# Patient Record
Sex: FEMALE | Race: WHITE | NOT HISPANIC OR LATINO | Employment: OTHER | URBAN - METROPOLITAN AREA
[De-identification: names, ages, dates, MRNs, and addresses within clinical notes are randomized per-mention and may not be internally consistent; named-entity substitution may affect disease eponyms.]

---

## 2017-03-22 ENCOUNTER — OFFICE VISIT (OUTPATIENT)
Dept: MEDICAL GROUP | Facility: LAB | Age: 67
End: 2017-03-22
Payer: MEDICARE

## 2017-03-22 VITALS
HEIGHT: 61 IN | HEART RATE: 49 BPM | TEMPERATURE: 97.9 F | SYSTOLIC BLOOD PRESSURE: 130 MMHG | RESPIRATION RATE: 16 BRPM | BODY MASS INDEX: 22.23 KG/M2 | WEIGHT: 117.73 LBS | DIASTOLIC BLOOD PRESSURE: 70 MMHG | OXYGEN SATURATION: 97 %

## 2017-03-22 DIAGNOSIS — Z13.6 ENCOUNTER FOR LIPID SCREENING FOR CARDIOVASCULAR DISEASE: ICD-10-CM

## 2017-03-22 DIAGNOSIS — M19.90 ARTHRITIS: ICD-10-CM

## 2017-03-22 DIAGNOSIS — Z13.220 ENCOUNTER FOR LIPID SCREENING FOR CARDIOVASCULAR DISEASE: ICD-10-CM

## 2017-03-22 DIAGNOSIS — Z00.00 HEALTH MAINTENANCE EXAMINATION: ICD-10-CM

## 2017-03-22 DIAGNOSIS — Z96.652 STATUS POST TOTAL LEFT KNEE REPLACEMENT: ICD-10-CM

## 2017-03-22 DIAGNOSIS — E78.00 ELEVATED LDL CHOLESTEROL LEVEL: ICD-10-CM

## 2017-03-22 DIAGNOSIS — Z12.11 SCREENING FOR MALIGNANT NEOPLASM OF COLON: ICD-10-CM

## 2017-03-22 DIAGNOSIS — Z13.21 ENCOUNTER FOR VITAMIN DEFICIENCY SCREENING: ICD-10-CM

## 2017-03-22 PROBLEM — Z96.659 S/P KNEE REPLACEMENT: Status: ACTIVE | Noted: 2017-03-22

## 2017-03-22 PROCEDURE — G8484 FLU IMMUNIZE NO ADMIN: HCPCS | Performed by: FAMILY MEDICINE

## 2017-03-22 PROCEDURE — G8432 DEP SCR NOT DOC, RNG: HCPCS | Performed by: FAMILY MEDICINE

## 2017-03-22 PROCEDURE — 1101F PT FALLS ASSESS-DOCD LE1/YR: CPT | Performed by: FAMILY MEDICINE

## 2017-03-22 PROCEDURE — 99204 OFFICE O/P NEW MOD 45 MIN: CPT | Performed by: FAMILY MEDICINE

## 2017-03-22 PROCEDURE — 4040F PNEUMOC VAC/ADMIN/RCVD: CPT | Mod: 8P | Performed by: FAMILY MEDICINE

## 2017-03-22 PROCEDURE — 3014F SCREEN MAMMO DOC REV: CPT | Mod: 8P | Performed by: FAMILY MEDICINE

## 2017-03-22 PROCEDURE — 1036F TOBACCO NON-USER: CPT | Performed by: FAMILY MEDICINE

## 2017-03-22 PROCEDURE — G8420 CALC BMI NORM PARAMETERS: HCPCS | Performed by: FAMILY MEDICINE

## 2017-03-22 ASSESSMENT — PATIENT HEALTH QUESTIONNAIRE - PHQ9: CLINICAL INTERPRETATION OF PHQ2 SCORE: 0

## 2017-03-22 NOTE — PROGRESS NOTES
Angelina Porras is a 67 y.o. female here for   Chief Complaint   Patient presents with   • Establish Care       HPI:  Angelina is a very pleasant 67 y.o. female. She recently moved from [place]. She is  and has 3 children. She is working thinking. Now, she sells [employer]    1. Arthritis  This is a new problem. Patient reports arthritis in her thumbs. She does not take any medication for this but it is starting to hinder her day-to-day activities. She is unable to twist jars and hold her . She feels as though her hand is stiffening up.    2. Status post total left knee replacement  This is chronic. Patient had multiple knee surgeries prior to having knee replacement. She needs a handicap placard filled out today. She is unable to walk long distances because of this.    3. Encounter for vitamin deficiency screening  Patient would like vitamin screening specifically homocystine. She takes juice plus blends that she states has improved her overall life. She is concerned that she may have other vitamin deficiencies.    4. Elevated LDL cholesterol  The patient's report, she has had elevated LDL. Her HDL has been high. She would like to recheck this. She does not want to go on any medication. She is not on any herbal supplements for this. She does not take it daily aspirin. She denies any stroke symptoms.      5. Health maintenance examination  Pap: Not done   Mammogram: Refuses, patient states that this causes cancer  Colonoscopy: Last done > 10 years ago, agreeable to FIT  DEXA: Declines  Tetanus booster: Declines all  Pneumonia vaccine: Declines all  Shingles vaccine: Declines all  Flu vaccine: Declines all  ASA 81mg > 65yrs: Not taking   Diet: healthy   Exercise: regular        Current medicines (including changes today)  Current Outpatient Prescriptions   Medication Sig Dispense Refill   • Diclofenac Sodium 1 % Gel Apply 1 Application to skin as directed 2 times a day as needed (pain). 1  "Tube 11     No current facility-administered medications for this visit.     She  has a past medical history of Arthritis.  She  has past surgical history that includes athroplasty and tonsillectomy.  Social History   Substance Use Topics   • Smoking status: Never Smoker    • Smokeless tobacco: Never Used   • Alcohol Use: No     Social History     Social History Narrative   • No narrative on file     Family History   Problem Relation Age of Onset   • Heart Disease Mother      CHF   • Arthritis Mother    • Heart Disease Father    • Diabetes Father      Family Status   Relation Status Death Age   • Mother     • Father           ROS  Positive for sneezing, nocturia  All other systems reviewed and are negative     Objective:     Blood pressure 130/70, pulse 49, temperature 36.6 °C (97.9 °F), resp. rate 16, height 1.543 m (5' 0.75\"), weight 53.4 kg (117 lb 11.6 oz), SpO2 97 %. Body mass index is 22.43 kg/(m^2).  Physical Exam:    Constitutional: Alert, no distress.  Skin: Warm, dry, good turgor, no rashes in visible areas.  Eye: Equal, round and reactive, conjunctiva clear, lids normal.  ENMT: Lips without lesions, good dentition, oropharynx clear. TM's pearly gray with normal light reflexes bilaterally  Neck: Trachea midline, no masses, no thyromegaly. No cervical or supraclavicular lymphadenopathy.  Respiratory: Unlabored respiratory effort, lungs clear to auscultation bilaterally, no wheezes, no ronchi.  Cardiovascular: Normal S1, S2, RRR, no murmur, no edema.  Abdomen: Soft, non-tender, no masses, no hepatosplenomegaly.  Psych: Alert and oriented x3, normal affect and mood.  MSK: Nodule at the metacarpal joint of the right thumb with some ulnar deviation of the thumb      Assessment and Plan:   The following treatment plan was discussed    1. Arthritis  New, not controlled  Trial of diclofenac gel  Follow-up no improvement  - CBC WITH DIFFERENTIAL; Future  - Diclofenac Sodium 1 % Gel; Apply 1 " Application to skin as directed 2 times a day as needed (pain).  Dispense: 1 Tube; Refill: 11    2. Status post total left knee replacement  Chronic, stable  Continue daily exercise  Handicap placard filled out    3. Encounter for vitamin deficiency screening  Labs ordered for further evaluation  - HOMOCYSTEINE; Future  - VITAMIN D,25 HYDROXY; Future    4. Health maintenance examination  FIT test and labs ordered  - HOMOCYSTEINE; Future  - LIPID PROFILE; Future  - CBC WITH DIFFERENTIAL; Future  - COMP METABOLIC PANEL; Future  - VITAMIN D,25 HYDROXY; Future    5. Screening for malignant neoplasm of colon  - OCCULT BLOOD FECES IMMUNOASSAY; Future    6. Encounter for lipid screening for cardiovascular disease  - LIPID PROFILE; Future    7. Elevated LDL cholesterol  Chronic, requires further workup  Labs ordered  Patient does not want to be on medication      Records requested.  Followup: Return in about 1 year (around 3/22/2018), or if symptoms worsen or fail to improve, for Annual.         This note was created using voice recognition software. I have made every reasonable attempt to correct errors, however, I do anticipate some grammatical errors.

## 2017-03-22 NOTE — MR AVS SNAPSHOT
"        Angelina Porras   3/22/2017 9:00 AM   Office Visit   MRN: [id number]    Department:  [facility]   Dept Phone:  [phone]    Description:  Female : 1950   Provider:  Vita Shetty M.D.           Reason for Visit     Establish Care           Allergies as of 3/22/2017     No Known Allergies      You were diagnosed with     Arthritis   [889299]       Status post total left knee replacement   [7687590]       Encounter for vitamin deficiency screening   [349228]       Health maintenance examination   [631421]       Screening for malignant neoplasm of colon   [8571394]       Encounter for lipid screening for cardiovascular disease   [7118388]       Elevated LDL cholesterol level   [500451]         Vital Signs     Blood Pressure Pulse Temperature Respirations Height Weight    130/70 mmHg 49 36.6 °C (97.9 °F) 16 1.543 m (5' 0.75\") 53.4 kg (117 lb 11.6 oz)    Body Mass Index Oxygen Saturation Smoking Status             22.43 kg/m2 97% Never Smoker          Basic Information     Date Of Birth Sex Race Ethnicity Preferred Language    1950 Female White Non- English      Problem List              ICD-10-CM Priority Class Noted - Resolved    Arthritis M19.90   3/22/2017 - Present    S/P knee replacement Z96.659   3/22/2017 - Present    Elevated LDL cholesterol level E78.00   3/22/2017 - Present      Health Maintenance        Date Due Completion Dates    IMM DTaP/Tdap/Td Vaccine (1 - Tdap) 1967 ---    MAMMOGRAM 1988 ---    COLONOSCOPY 1998 ---    IMM ZOSTER VACCINE 2008 ---    BONE DENSITY 2013 ---    IMM PNEUMOCOCCAL 65+ (ADULT) LOW/MEDIUM RISK SERIES (1 of 2 - PCV13) 2013 ---    IMM INFLUENZA (1) 2016 ---            Current Immunizations     No immunizations on file.      Below and/or attached are the medications your provider expects you to take. Review all of your home medications and newly ordered medications with your provider and/or " pharmacist. Follow medication instructions as directed by your provider and/or pharmacist. Please keep your medication list with you and share with your provider. Update the information when medications are discontinued, doses are changed, or new medications (including over-the-counter products) are added; and carry medication information at all times in the event of emergency situations     Allergies:  No Known Allergies          Medications  Valid as of: March 22, 2017 -  1:40 PM    Generic Name Brand Name Tablet Size Instructions for use    Diclofenac Sodium (Gel) Diclofenac Sodium 1 % Apply 1 Application to skin as directed 2 times a day as needed (pain).        .                 Medicines prescribed today were sent to:     [facility] [address] [place] [address] - [address] [facility]    [address] [facility] [address]    Phone: [phone] Fax: [phone]    Open 24 Hours?: No      Medication refill instructions:       If your prescription bottle indicates you have medication refills left, it is not necessary to call your provider’s office. Please contact your pharmacy and they will refill your medication.    If your prescription bottle indicates you do not have any refills left, you may request refills at any time through one of the following ways: The online [employer] system (except Urgent Care), by calling your provider’s office, or by asking your pharmacy to contact your provider’s office with a refill request. Medication refills are processed only during regular business hours and may not be available until the next business day. Your provider may request additional information or to have a follow-up visit with you prior to refilling your medication.   *Please Note: Medication refills are assigned a new Rx number when refilled electronically. Your pharmacy may indicate that no refills were authorized even though a new prescription for the same medication is available at the pharmacy. Please request the  medicine by name with the pharmacy before contacting your provider for a refill.        Your To Do List     Future Labs/Procedures Complete By Expires    CBC WITH DIFFERENTIAL  As directed 3/23/2018    COMP METABOLIC PANEL  As directed 3/23/2018    HOMOCYSTEINE  As directed 3/23/2018    LIPID PROFILE  As directed 3/23/2018    OCCULT BLOOD FECES IMMUNOASSAY  As directed 3/22/2018    VITAMIN D,25 HYDROXY  As directed 3/23/2018         MyChart Access Code: Activation code not generated  Current [employer] Status: Active

## 2017-03-31 ENCOUNTER — TELEPHONE (OUTPATIENT)
Dept: MEDICAL GROUP | Facility: LAB | Age: 67
End: 2017-03-31

## 2017-03-31 NOTE — TELEPHONE ENCOUNTER
Patient called asking to change her medication. She read the side effects of her diclofenac sodium and she now refuses to take it. She would like a natural way to take care of her arthritis

## 2017-04-02 NOTE — TELEPHONE ENCOUNTER
"Please let the patient know that she can go to [email or web address] then go to \"treatments\" then go to \"natural treatments\" and there is great, reputable information on natural treatments for arthritis. All of these can be purchased over the counter or online.     Thanks!    Vita Shetty M.D.    "

## 2018-06-15 ENCOUNTER — OFFICE VISIT (OUTPATIENT)
Dept: MEDICAL GROUP | Facility: LAB | Age: 68
End: 2018-06-15
Payer: MEDICARE

## 2018-06-15 VITALS
SYSTOLIC BLOOD PRESSURE: 98 MMHG | WEIGHT: 106 LBS | RESPIRATION RATE: 14 BRPM | HEIGHT: 60 IN | HEART RATE: 56 BPM | DIASTOLIC BLOOD PRESSURE: 62 MMHG | OXYGEN SATURATION: 98 % | BODY MASS INDEX: 20.81 KG/M2 | TEMPERATURE: 98.2 F

## 2018-06-15 DIAGNOSIS — M25.511 ACUTE PAIN OF RIGHT SHOULDER: ICD-10-CM

## 2018-06-15 PROCEDURE — 99214 OFFICE O/P EST MOD 30 MIN: CPT | Performed by: PHYSICIAN ASSISTANT
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
## 2018-06-19 ENCOUNTER — HOSPITAL ENCOUNTER (OUTPATIENT)
Dept: RADIOLOGY | Facility: MEDICAL CENTER | Age: 68
End: 2018-06-19
Attending: PHYSICIAN ASSISTANT
Payer: MEDICARE

## 2018-06-19 DIAGNOSIS — M25.511 ACUTE PAIN OF RIGHT SHOULDER: ICD-10-CM

## 2018-06-19 PROCEDURE — 73221 MRI JOINT UPR EXTREM W/O DYE: CPT | Mod: RT

## 2019-06-27 ENCOUNTER — TELEPHONE (OUTPATIENT)
Dept: MEDICAL GROUP | Facility: LAB | Age: 69
End: 2019-06-27

## 2019-06-27 ENCOUNTER — OFFICE VISIT (OUTPATIENT)
Dept: MEDICAL GROUP | Facility: LAB | Age: 69
End: 2019-06-27
Payer: MEDICARE

## 2019-06-27 ENCOUNTER — HOSPITAL ENCOUNTER (OUTPATIENT)
Dept: RADIOLOGY | Facility: MEDICAL CENTER | Age: 69
End: 2019-06-27
Attending: FAMILY MEDICINE
Payer: MEDICARE

## 2019-06-27 VITALS
WEIGHT: 108.8 LBS | OXYGEN SATURATION: 97 % | TEMPERATURE: 97.8 F | HEIGHT: 60 IN | SYSTOLIC BLOOD PRESSURE: 108 MMHG | HEART RATE: 47 BPM | DIASTOLIC BLOOD PRESSURE: 60 MMHG | BODY MASS INDEX: 21.36 KG/M2

## 2019-06-27 DIAGNOSIS — E78.00 ELEVATED LDL CHOLESTEROL LEVEL: ICD-10-CM

## 2019-06-27 DIAGNOSIS — S59.902A ELBOW INJURY, LEFT, INITIAL ENCOUNTER: ICD-10-CM

## 2019-06-27 DIAGNOSIS — Z13.0 SCREENING FOR DEFICIENCY ANEMIA: ICD-10-CM

## 2019-06-27 DIAGNOSIS — M19.041 PRIMARY OSTEOARTHRITIS OF BOTH HANDS: ICD-10-CM

## 2019-06-27 DIAGNOSIS — R79.89 ELEVATED LFTS: ICD-10-CM

## 2019-06-27 DIAGNOSIS — M19.042 PRIMARY OSTEOARTHRITIS OF BOTH HANDS: ICD-10-CM

## 2019-06-27 DIAGNOSIS — R73.9 HYPERGLYCEMIA: ICD-10-CM

## 2019-06-27 DIAGNOSIS — S42.495A OTHER CLOSED NONDISPLACED FRACTURE OF DISTAL END OF LEFT HUMERUS, INITIAL ENCOUNTER: ICD-10-CM

## 2019-06-27 DIAGNOSIS — M25.532 LEFT WRIST PAIN: ICD-10-CM

## 2019-06-27 PROBLEM — M25.511 ACUTE PAIN OF RIGHT SHOULDER: Status: RESOLVED | Noted: 2018-06-15 | Resolved: 2019-06-27

## 2019-06-27 PROCEDURE — 99214 OFFICE O/P EST MOD 30 MIN: CPT | Performed by: FAMILY MEDICINE

## 2019-06-27 PROCEDURE — 73110 X-RAY EXAM OF WRIST: CPT | Mod: LT

## 2019-06-27 PROCEDURE — 73080 X-RAY EXAM OF ELBOW: CPT | Mod: LT

## 2019-06-27 ASSESSMENT — PATIENT HEALTH QUESTIONNAIRE - PHQ9: CLINICAL INTERPRETATION OF PHQ2 SCORE: 0

## 2019-06-27 NOTE — ASSESSMENT & PLAN NOTE
"Patient is wondering what she can use for her bilateral hand pain secondary to her arthritis.  Dr. Shetty had ordered Voltaren but the patient did not use it because it was not \"natural\".  She would like something natural for this.  "
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
## 2019-06-27 NOTE — PATIENT INSTRUCTIONS
Arnica  Move Free    Elbow Injury  You or your child has an elbow injury. X-rays and exam today do not show evidence of a fracture (broken bone). That means that only a sling or splint may be required for a brief period of time as directed by your caregiver.  HOME CARE INSTRUCTIONS  · Only take over-the-counter or prescription medicines for pain, discomfort, or fever as directed by your caregiver.   · If you have a splint held on with an elastic wrap or a cast, watch your hand or fingers. If they become numb or cold and blue, loosen the wrap and reapply more loosely. See your caregiver if there is no relief.   · You may use ice on your elbow for 15-20 minutes, 3-4 times per day, for the first 2 to 3 days.   · Use your elbow as directed.   · See your caregiver as directed. It is very important to keep all follow-up referrals and appointments in order to avoid any long-term problems with your elbow including chronic pain or inability to move the elbow normally.   SEEK IMMEDIATE MEDICAL CARE IF:   · There is swelling or increasing pain in your elbow which is not relieved with medications.   · You begin to lose feeling in your hand or fingers, or develop swelling of the hand and fingers.   · You get a cold or blue hand or fingers on injured side.   · If your elbow remains sore, your caregiver may want to x-ray it again. A hairline fracture may not show up on the first x-rays and may only be seen on repeat x-rays ten days to two weeks later. A specialist (radiologist) may examine your x-rays at a later time. In order to get results from the radiologist or their department, make sure you know how and when you are to get that information. It is your responsibility to get results of any tests you may have had.   MAKE SURE YOU:   · Understand these instructions.   · Will watch your condition.   · Will get help right away if you are not doing well or get worse.   Document Released: 03/09/2005 Document Revised: 03/11/2013  Document Reviewed: 08/05/2009  ExitCare® Patient Information ©2013 [employer].
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
## 2020-06-09 ENCOUNTER — TELEPHONE (OUTPATIENT)
Dept: MEDICAL GROUP | Facility: LAB | Age: 70
End: 2020-06-09

## 2020-06-09 NOTE — TELEPHONE ENCOUNTER
Attempted to call patient to inform her Dr. Shetty is no longer with [facility] and to please call [phone] to establish with a new provider.

## 2021-04-14 PROBLEM — E78.00 ELEVATED LDL CHOLESTEROL LEVEL: Status: RESOLVED | Noted: 2017-03-22 | Resolved: 2021-04-14

## 2021-04-14 PROBLEM — R73.9 HYPERGLYCEMIA: Status: RESOLVED | Noted: 2019-06-27 | Resolved: 2021-04-14

## 2021-04-14 PROBLEM — E78.01 FAMILIAL HYPERCHOLESTEROLEMIA: Status: ACTIVE | Noted: 2021-04-14

## 2021-04-14 PROBLEM — S59.902A ELBOW INJURY, LEFT, INITIAL ENCOUNTER: Status: RESOLVED | Noted: 2019-06-27 | Resolved: 2021-04-14

## 2021-09-20 ENCOUNTER — APPOINTMENT (RX ONLY)
Dept: URBAN - METROPOLITAN AREA CLINIC 4 | Facility: CLINIC | Age: 71
Setting detail: DERMATOLOGY
End: 2021-09-20

## 2021-09-20 DIAGNOSIS — L0391 CELLULITIS AND ABSCESS OF UNSPECIFIED SITES: ICD-10-CM

## 2021-09-20 DIAGNOSIS — L0390 CELLULITIS AND ABSCESS OF UNSPECIFIED SITES: ICD-10-CM

## 2021-09-20 PROBLEM — L02.214 CUTANEOUS ABSCESS OF GROIN: Status: ACTIVE | Noted: 2021-09-20

## 2021-09-20 PROCEDURE — ? PRESCRIPTION MEDICATION MANAGEMENT

## 2021-09-20 PROCEDURE — ? PRESCRIPTION

## 2021-09-20 PROCEDURE — ? INCISION AND DRAINAGE

## 2021-09-20 PROCEDURE — ? COUNSELING

## 2021-09-20 PROCEDURE — 10060 I&D ABSCESS SIMPLE/SINGLE: CPT

## 2021-09-20 PROCEDURE — ? ORDER TESTS

## 2021-09-20 RX ORDER — DOXYCYCLINE HYCLATE 100 MG/1
CAPSULE, GELATIN COATED ORAL
Qty: 20 | Refills: 0 | Status: ERX | COMMUNITY
Start: 2021-09-20

## 2021-09-20 RX ADMIN — DOXYCYCLINE HYCLATE: 100 CAPSULE, GELATIN COATED ORAL at 00:00

## 2021-09-20 ASSESSMENT — LOCATION SIMPLE DESCRIPTION DERM: LOCATION SIMPLE: GROIN

## 2021-09-20 ASSESSMENT — LOCATION ZONE DERM: LOCATION ZONE: TRUNK

## 2021-09-20 ASSESSMENT — LOCATION DETAILED DESCRIPTION DERM: LOCATION DETAILED: LEFT INGUINAL CREASE

## 2021-09-20 NOTE — PROCEDURE: ORDER TESTS
Billing Type: Third-Party Bill
Expected Date Of Service: 09/20/2021
Bill For Surgical Tray: no
Performing Laboratory: -[phone]

## 2021-09-20 NOTE — PROCEDURE: MIPS QUALITY
Quality 111:Pneumonia Vaccination Status For Older Adults: Pneumococcal Vaccination not Administered or Previously Received, Reason not Otherwise Specified
Detail Level: Detailed
Quality 226: Preventive Care And Screening: Tobacco Use: Screening And Cessation Intervention: Patient screened for tobacco use and is an ex/non-smoker
Quality 402: Tobacco Use And Help With Quitting Among Adolescents: Patient screened for tobacco and never smoked
Quality 130: Documentation Of Current Medications In The Medical Record: Current Medications Documented
Quality 431: Preventive Care And Screening: Unhealthy Alcohol Use - Screening: Patient not identified as an unhealthy alcohol user when screened for unhealthy alcohol use using a systematic screening method

## 2021-09-20 NOTE — PROCEDURE: INCISION AND DRAINAGE
Detail Level: Detailed
Lesion Type: Abscess
Method: 11 blade
Curette: No
Anesthesia Type: 1% lidocaine with epinephrine
Anesthesia Volume In Cc: 1.5
Size Of Lesion In Cm (Optional But May Be Required For Some Insurances): 1
Wound Care: Mupirocin
Dressing: dry sterile dressing
Epidermal Sutures: 4-0 Ethilon
Epidermal Closure: simple interrupted
Suture Text: The incision was partially closed with
Preparation Text: The area was prepped in the usual clean fashion.
Curette Text (Optional): After the contents were expressed a curette was used to partially remove the cyst wall.
Consent was obtained and risks were reviewed including but not limited to delayed wound healing, infection, need for multiple I and D's, and pain.
Post-Care Instructions: I reviewed with the patient in detail post-care instructions. Patient should keep wound covered and call the office should any redness, pain, swelling or worsening occur.

## 2021-09-20 NOTE — PROCEDURE: PRESCRIPTION MEDICATION MANAGEMENT
Render In Strict Bullet Format?: No
Detail Level: Zone
Initiate Treatment: Doxycycline 100mg BID x 10 days
Plan: Will tailor antibiotic therapy based on culture sensitivities if needed

## 2021-10-04 ENCOUNTER — APPOINTMENT (RX ONLY)
Dept: URBAN - METROPOLITAN AREA CLINIC 4 | Facility: CLINIC | Age: 71
Setting detail: DERMATOLOGY
End: 2021-10-04

## 2021-10-04 DIAGNOSIS — L72.8 OTHER FOLLICULAR CYSTS OF THE SKIN AND SUBCUTANEOUS TISSUE: ICD-10-CM | Status: IMPROVED

## 2021-10-04 PROCEDURE — ? TREATMENT REGIMEN

## 2021-10-04 PROCEDURE — ? INTRALESIONAL KENALOG

## 2021-10-04 PROCEDURE — ? ADDITIONAL NOTES

## 2021-10-04 PROCEDURE — 11900 INJECT SKIN LESIONS </W 7: CPT

## 2021-10-04 ASSESSMENT — LOCATION ZONE DERM: LOCATION ZONE: LEG

## 2021-10-04 ASSESSMENT — LOCATION DETAILED DESCRIPTION DERM: LOCATION DETAILED: LEFT ANTERIOR PROXIMAL THIGH

## 2021-10-04 ASSESSMENT — LOCATION SIMPLE DESCRIPTION DERM: LOCATION SIMPLE: LEFT THIGH

## 2021-10-04 NOTE — PROCEDURE: INTRALESIONAL KENALOG
Include Z78.9 (Other Specified Conditions Influencing Health Status) As An Associated Diagnosis?: No
Consent: The risks of atrophy were reviewed with the patient.
Concentration Of Kenalog Solution Injected (Mg/Ml): 10.0
Kenalog Preparation: Kenalog
Treatment Number (Optional): 1
Detail Level: Detailed
Validate Note Data When Using Inventory: Yes
Medical Necessity Clause: This procedure was medically necessary because the lesions that were treated were:
Ndc# For Kenalog Only: 0276-9785-58
Lot # For Kenalog (Optional): [id number]
X Size Of Lesion In Cm (Optional): 0
Total Volume (Ccs): .3
Administered By (Optional): Dr. Almodovar
Expiration Date For Kenalog (Optional): 2/2023

## 2021-10-04 NOTE — PROCEDURE: MIPS QUALITY
Quality 110: Preventive Care And Screening: Influenza Immunization: Influenza immunization was not ordered or administered, reason not given
Quality 226: Preventive Care And Screening: Tobacco Use: Screening And Cessation Intervention: Patient screened for tobacco use and is an ex/non-smoker
Quality 111:Pneumonia Vaccination Status For Older Adults: Pneumococcal Vaccination Previously Received
Quality 130: Documentation Of Current Medications In The Medical Record: Current Medications Documented
Detail Level: Detailed
Quality 431: Preventive Care And Screening: Unhealthy Alcohol Use - Screening: Patient not identified as an unhealthy alcohol user when screened for unhealthy alcohol use using a systematic screening method

## 2021-10-04 NOTE — PROCEDURE: ADDITIONAL NOTES
Detail Level: Simple
Render Risk Assessment In Note?: no
Additional Notes: No sign of infection at today’s visit, additional cyst contents were expressed before ILK injection. Discussed that she can call if still draining or causing pain in 3-4 weeks for repeat injection or punch excision

## 2021-10-04 NOTE — PROCEDURE: TREATMENT REGIMEN
Detail Level: Zone
Initiate Treatment: Recommend daily cleansing with chlorhexidine 4% wash starting tomorrow

## 2021-11-08 ENCOUNTER — HOSPITAL ENCOUNTER (OUTPATIENT)
Dept: RADIOLOGY | Facility: MEDICAL CENTER | Age: 71
End: 2021-11-08
Payer: MEDICARE

## 2021-11-15 ENCOUNTER — HOSPITAL ENCOUNTER (INPATIENT)
Facility: REHABILITATION | Age: 71
End: 2021-11-15
Attending: PHYSICAL MEDICINE & REHABILITATION | Admitting: PHYSICAL MEDICINE & REHABILITATION
Payer: MEDICARE

## 2021-12-08 ENCOUNTER — OFFICE VISIT (OUTPATIENT)
Dept: OPHTHALMOLOGY | Facility: MEDICAL CENTER | Age: 71
End: 2021-12-08
Payer: MEDICARE

## 2021-12-08 DIAGNOSIS — H49.22 LEFT ABDUCENS NERVE PALSY: ICD-10-CM

## 2021-12-08 DIAGNOSIS — G51.0 7TH NERVE PALSY: ICD-10-CM

## 2021-12-08 DIAGNOSIS — G93.0 EPIDERMOID CYST OF BRAIN: ICD-10-CM

## 2021-12-08 DIAGNOSIS — H46.9 OPTIC NEUROPATHY: ICD-10-CM

## 2021-12-08 PROCEDURE — 92250 FUNDUS PHOTOGRAPHY W/I&R: CPT | Performed by: OPHTHALMOLOGY

## 2021-12-08 PROCEDURE — 99204 OFFICE O/P NEW MOD 45 MIN: CPT | Mod: 25 | Performed by: OPHTHALMOLOGY

## 2021-12-08 PROCEDURE — 92060 SENSORIMOTOR EXAMINATION: CPT | Performed by: OPHTHALMOLOGY

## 2021-12-08 RX ORDER — FAMOTIDINE 20 MG/1
20 TABLET, FILM COATED ORAL
COMMUNITY
Start: 2021-11-15 | End: 2022-04-19

## 2021-12-08 RX ORDER — DEXAMETHASONE 0.5 MG/1
TABLET ORAL
COMMUNITY
Start: 2021-11-21 | End: 2022-04-19

## 2021-12-08 ASSESSMENT — REFRACTION_WEARINGRX
OS_SPHERE: -0.75
OD_CYLINDER: +1.00
SPECS_TYPE: SVL
OD_AXIS: 003
OD_SPHERE: +2.25
OD_CYLINDER: +2.00
OS_CYLINDER: +1.50
OS_CYLINDER: +1.75
OD_AXIS: 177
OS_AXIS: 168
OD_SPHERE: -0.50
SPECS_TYPE: SVL
OS_AXIS: 167
OS_SPHERE: +1.25

## 2021-12-08 ASSESSMENT — CUP TO DISC RATIO
OD_RATIO: 0.3
OS_RATIO: 0.3

## 2021-12-08 ASSESSMENT — CONF VISUAL FIELD
OS_NORMAL: 1
OD_NORMAL: 1

## 2021-12-08 ASSESSMENT — REFRACTION_MANIFEST
METHOD_AUTOREFRACTION: 1
OD_AXIS: 165
OS_CYLINDER: +2.50
OD_CYLINDER: +1.75
OS_AXIS: 171
OD_SPHERE: +0.50
OS_SPHERE: -1.00

## 2021-12-08 ASSESSMENT — ENCOUNTER SYMPTOMS
DOUBLE VISION: 1
BLURRED VISION: 1

## 2021-12-08 ASSESSMENT — REFRACTION
OS_CYLINDER: +3.50
OS_SPHERE: -1.25
OD_CYLINDER: +1.75
OD_AXIS: 162
OD_SPHERE: +0.25
OS_AXIS: 162

## 2021-12-08 ASSESSMENT — TONOMETRY
OD_IOP_MMHG: 13
OS_IOP_MMHG: 11
IOP_METHOD: I-CARE

## 2021-12-08 ASSESSMENT — VISUAL ACUITY
OS_CC+: -1
OS_PH_CC: 20/50
OS_PH_CC+: +1
CORRECTION_TYPE: GLASSES
OD_CC: 20/20
OS_CC: 20/60
METHOD: SNELLEN - LINEAR

## 2021-12-08 ASSESSMENT — SLIT LAMP EXAM - LIDS
COMMENTS: LOWER LID RETRACTION
COMMENTS: NORMAL

## 2021-12-08 ASSESSMENT — EXTERNAL EXAM - RIGHT EYE: OD_EXAM: NORMAL

## 2021-12-08 NOTE — ASSESSMENT & PLAN NOTE
12/8/2021 - Left 6h nerve palsy following removal of CP angle epidermoid cyst 12/12/2021 - Eye barely getting to the midline. Dicussed continuing to monitor and at this point since eye so deviated could not be a candidate for temporary prisms. Hopefully will show improvement over the next few months. Dicussed alterate patching. Currently wearing an occlusion patch over the left lens

## 2021-12-08 NOTE — PROGRESS NOTES
Peds/Neuro Ophthalmology:   Dion Johnson M.D.    Date & Time note created:    12/8/2021   11:03 AM     Referring MD / APRN:  ANGELINA Gentile, No att. providers found    Patient ID:  Name:             Angelina Porras   YOB: 1950  Age:                 71 y.o.  female   MRN:               [id number]    Chief Complaint/Reason for Visit:     Other (New patient for Double vision after brain surgery done at [facility])      History of Present Illness:    Angelina Porras is a 71 y.o. female   Pt is here for New patient for Double vision after brain surgery done at [facility]. Pt states vision is really blurry with left eye. She has to wear a patch over the left eye on the glasses to help her see well. Pt states the double vision started right after she had brain surgery on November 12 of 2021. Pt also got after brain surgery bell's palsy on the left side of face not able to close left eye all the way due to this. Pt uses tape to close left eye when she goes to bed. Pt denies pain or discomfort in OU. Pt did have a corneal lesion on the left eye but that has healed per her optometrist.  Pt denies headaches.       Review of Systems:  Review of Systems   Eyes: Positive for blurred vision and double vision.        Corneal lesion left eye  Not able to close left eye tapes it shut every night   Neurological:        Bell's palsy   All other systems reviewed and are negative.      Past Medical History:   Past Medical History:   Diagnosis Date   • Arthritis    • Diplopia    • Epidermoid cyst    • Left-sided Bell's palsy        Past Surgical History:  Past Surgical History:   Procedure Laterality Date   • CRANIOTOMY  11/12/2021    Epidermoid cyst   • ARTHROPLASTY      total left knee   • TONSILLECTOMY         Current Outpatient Medications:  Current Outpatient Medications   Medication Sig Dispense Refill   • dexamethasone (DECADRON) 0.5 MG Tab      • famotidine (PEPCID) 20 MG Tab 20 mg.     • Sennosides  (SENNA LAXATIVE PO) Take  by mouth.     • meclizine (ANTIVERT) 25 MG Tab Take 1 Tablet by mouth 3 times a day as needed. (Patient not taking: Reported on 12/8/2021) 30 Tablet 0   • Ivermectin 3 MG Tab Take 6 tablets daily x 5 days. With food in am. [facility] protocol (Patient not taking: Reported on 12/8/2021) 30 tablet 0   • benzonatate (TESSALON PERLES) 100 MG Cap Take 1 capsule by mouth 3 times a day as needed for Cough. (Patient not taking: Reported on 12/8/2021) 30 capsule 0     No current facility-administered medications for this visit.       Allergies:  Allergies   Allergen Reactions   • Melatonin      Other reaction(s): body aches       Family History:  Family History   Problem Relation Age of Onset   • Heart Disease Mother         CHF   • Arthritis Mother    • Heart Disease Father    • Diabetes Father        Social History:  Social History     Socioeconomic History   • Marital status:      Spouse name: Not on file   • Number of children: Not on file   • Years of education: Not on file   • Highest education level: Not on file   Occupational History   • Not on file   Tobacco Use   • Smoking status: Never Smoker   • Smokeless tobacco: Never Used   Vaping Use   • Vaping Use: Never used   Substance and Sexual Activity   • Alcohol use: No   • Drug use: No   • Sexual activity: Yes     Partners: Male   Other Topics Concern   • Not on file   Social History Narrative   • Not on file     Social Determinants of Health     Financial Resource Strain:    • Difficulty of Paying Living Expenses: Not on file   Food Insecurity:    • Worried About Running Out of Food in the Last Year: Not on file   • Ran Out of Food in the Last Year: Not on file   Transportation Needs:    • Lack of Transportation (Medical): Not on file   • Lack of Transportation (Non-Medical): Not on file   Physical Activity:    • Days of Exercise per Week: Not on file   • Minutes of Exercise per Session: Not on file   Stress:    • Feeling of Stress : Not  on file   Social Connections:    • Frequency of Communication with Friends and Family: Not on file   • Frequency of Social Gatherings with Friends and Family: Not on file   • Attends Tenriism Services: Not on file   • Active Member of Clubs or Organizations: Not on file   • Attends Club or Organization Meetings: Not on file   • Marital Status: Not on file   Intimate Partner Violence:    • Fear of Current or Ex-Partner: Not on file   • Emotionally Abused: Not on file   • Physically Abused: Not on file   • Sexually Abused: Not on file   Housing Stability:    • Unable to Pay for Housing in the Last Year: Not on file   • Number of Places Lived in the Last Year: Not on file   • Unstable Housing in the Last Year: Not on file          Physical Exam:  Physical Exam    Oriented x 3  Weight/BMI: There is no height or weight on file to calculate BMI.  There were no vitals taken for this visit.    Base Eye Exam     Visual Acuity (Snellen - Linear)       Right Left    Dist cc 20/20 20/60 -1    Dist ph cc NI 20/50 +1    Correction: Glasses          Tonometry ([facility], 9:21 AM)       Right Left    Pressure 13 11          Pupils       Pupils    Right PERRL    Left PERRL          Visual Fields       Right Left     Full Full          Neuro/Psych     Oriented x3: Yes    Mood/Affect: Normal          Dilation     Both eyes: Tropicamide (MYDRIACYL) 1% ophthalmic solution, Phenylephrine (NEOSYNEPHRINE) ophthalmic solution 2.5% @ 10:57 AM            Additional Tests     Color       Right Left    Ishihara 9/9 9/9          Stereo     Fly: -    Animals: 0/3    Circles: 0/9            Strabismus Exam     Method: Krimsky    Correction: sc    Distance Near Near +3DS N Bifocals                    0 0 0   0 0 0                       0  0  LET 40 0  -4                       0 0 0   0 0 0                   Slit Lamp and Fundus Exam     External Exam       Right Left    External Normal Left peripheral 7th          Slit Lamp Exam       Right Left     Lids/Lashes Normal lower lid retraction     Conjunctiva/Sclera White and quiet inferior injection and chemosis    Cornea Clear inferotemproal corneal epi irregularity    Anterior Chamber Deep and quiet Deep and quiet    Iris Round and reactive Round and reactive    Lens Clear Clear    Vitreous Normal Normal          Fundus Exam       Right Left    Disc Tilted disc Tilted disc    C/D Ratio 0.3 0.3    Macula Normal Normal    Vessels Normal Normal    Periphery Normal Normal            Refraction     Wearing Rx       Sphere Cylinder Axis    Right -0.50 +2.00 177    Left -0.75 +1.75 168    Age: 1yr    Type: SVL          Wearing Rx #2       Sphere Cylinder Axis    Right +2.25 +1.00 003    Left +1.25 +1.50 167    Age: 1yr    Type: SVL          Manifest Refraction (Auto)       Sphere Cylinder Axis    Right +0.50 +1.75 165    Left -1.00 +2.50 171          Cycloplegic Refraction (Auto)       Sphere Cylinder Axis    Right +0.25 +1.75 162    Left -1.25 +3.50 162                Pertinent Lab/Test/Imaging Review:      Assessment and Plan:     Left abducens nerve palsy  12/8/2021 - Left 6h nerve palsy following removal of CP angle epidermoid cyst 12/12/2021 - Eye barely getting to the midline. Dicussed continuing to monitor and at this point since eye so deviated could not be a candidate for temporary prisms. Hopefully will show improvement over the next few months. Dicussed alterate patching. Currently wearing an occlusion patch over the left lens     7th nerve palsy  12/8/2021 - Left 7th nerve palsy following CP angle epidermoid cyst removal 11/12/2021 at [facility]. Currently has significant lower lid retraction. Some bells, but inferior temporal chemosis an epithelial irregularity. Discussed changing to Lacrilube tid. Will refer to Dr Edwin Quesada in oculo plastics since would probably benefit from tarsorrhaphy.     Epidermoid cyst of brain  12/8/2021 - obtained Oct NFl thickness that was normal at 96 Od and 95 OS. No evidence of optic  nerve swelling from increased intracranial pressure        Dion Johnson M.D.

## 2021-12-08 NOTE — ASSESSMENT & PLAN NOTE
12/8/2021 - obtained Oct NFl thickness that was normal at 96 Od and 95 OS. No evidence of optic nerve swelling from increased intracranial pressure

## 2021-12-08 NOTE — ASSESSMENT & PLAN NOTE
12/8/2021 - Left 7th nerve palsy following CP angle epidermoid cyst removal 11/12/2021 at [facility]. Currently has significant lower lid retraction. Some bells, but inferior temporal chemosis an epithelial irregularity. Discussed changing to Lacrilube tid. Will refer to Dr Edwin Quesada in oculo plastics since would probably benefit from tarsorrhaphy.

## 2022-01-19 ENCOUNTER — OFFICE VISIT (OUTPATIENT)
Dept: OPHTHALMOLOGY | Facility: MEDICAL CENTER | Age: 72
End: 2022-01-19
Payer: MEDICARE

## 2022-01-19 DIAGNOSIS — G93.0 EPIDERMOID CYST OF BRAIN: ICD-10-CM

## 2022-01-19 DIAGNOSIS — H49.22 LEFT ABDUCENS NERVE PALSY: ICD-10-CM

## 2022-01-19 DIAGNOSIS — G51.0 7TH NERVE PALSY: ICD-10-CM

## 2022-01-19 PROCEDURE — 92060 SENSORIMOTOR EXAMINATION: CPT | Performed by: OPHTHALMOLOGY

## 2022-01-19 PROCEDURE — 99213 OFFICE O/P EST LOW 20 MIN: CPT | Performed by: OPHTHALMOLOGY

## 2022-01-19 ASSESSMENT — CONF VISUAL FIELD
OD_NORMAL: 1
OS_NORMAL: 1

## 2022-01-19 ASSESSMENT — REFRACTION_WEARINGRX
OD_CYLINDER: +2.00
OS_CYLINDER: +1.75
OS_CYLINDER: +1.50
SPECS_TYPE: SVL
OD_CYLINDER: +1.00
OD_AXIS: 003
OS_SPHERE: +1.25
SPECS_TYPE: SVL
OS_AXIS: 168
OD_SPHERE: -0.50
OD_AXIS: 177
OS_AXIS: 167
OD_SPHERE: +2.25
OS_SPHERE: -0.75

## 2022-01-19 ASSESSMENT — VISUAL ACUITY
OS_CC+: +1
OS_CC: 20/200
METHOD: SNELLEN - LINEAR
OD_CC: 20/20
CORRECTION_TYPE: GLASSES

## 2022-01-19 ASSESSMENT — EXTERNAL EXAM - RIGHT EYE: OD_EXAM: NORMAL

## 2022-01-19 ASSESSMENT — CUP TO DISC RATIO
OD_RATIO: 0.3
OS_RATIO: 0.3

## 2022-01-19 ASSESSMENT — TONOMETRY
IOP_METHOD: I-CARE
OD_IOP_MMHG: 13

## 2022-01-19 ASSESSMENT — SLIT LAMP EXAM - LIDS
COMMENTS: NORMAL
COMMENTS: LOWER LID RETRACTION

## 2022-01-19 ASSESSMENT — ENCOUNTER SYMPTOMS: BLURRED VISION: 1

## 2022-01-19 NOTE — ASSESSMENT & PLAN NOTE
12/8/2021 - Left 7th nerve palsy following CP angle epidermoid cyst removal 11/12/2021 at [facility]. Currently has significant lower lid retraction. Some bells, but inferior temporal chemosis an epithelial irregularity. Discussed changing to Lacrilube tid. Will refer to Dr Edwin Quesada in oculo plastics since would probably benefit from tarsorrhaphy.   1/19/2022 - Temporary tarsorrhaphy by Dr Quesada. Doing well. No significant corneal exposure today. Continue ocular lubricants

## 2022-01-19 NOTE — PROGRESS NOTES
Peds/Neuro Ophthalmology:   Dion Johnson M.D.    Date & Time note created:    1/19/2022   11:22 AM     Referring MD / APRN:  ANGELINA Gentile, No att. providers found    Patient ID:  Name:             Angelina Porras   YOB: 1950  Age:                 71 y.o.  female   MRN:               [id number]    Chief Complaint/Reason for Visit:     Other (1 month F/u for 7th nerve palsy)      History of Present Illness:    Angelina Porras is a 71 y.o. female   Pt is here for 1 month F/u for 7th nerve palsy. Pt states she had surgery with Dr. Quesada on December 17,2021 left eyelid was closed shut. Pt has been seeing Dr Quesada for post op. Pt states last post op was two days ago and was told cornea looks better. Pt feels that she is getting better.       Review of Systems:  Review of Systems   Eyes: Positive for blurred vision.        Left abducens nerve palsy  7th nerve palsy   All other systems reviewed and are negative.      Past Medical History:   Past Medical History:   Diagnosis Date   • Arthritis    • Diplopia    • Epidermoid cyst    • Left-sided Bell's palsy        Past Surgical History:  Past Surgical History:   Procedure Laterality Date   • CRANIOTOMY  11/12/2021    Epidermoid cyst   • ARTHROPLASTY      total left knee   • EYE SURGERY Left 12/17/2022    Tarorrhaphy   • TONSILLECTOMY         Current Outpatient Medications:  Current Outpatient Medications   Medication Sig Dispense Refill   • Carboxymethylcellulose Sodium (REFRESH LIQUIGEL OP) Administer 1 Drop into affected eye(s).     • Carboxymethylcellulose Sodium (REFRESH TEARS OP) Administer  into affected eye(s).     • Sennosides (SENNA LAXATIVE PO) Take  by mouth.     • dexamethasone (DECADRON) 0.5 MG Tab  (Patient not taking: Reported on 1/19/2022)     • famotidine (PEPCID) 20 MG Tab 20 mg. (Patient not taking: Reported on 1/19/2022)     • meclizine (ANTIVERT) 25 MG Tab Take 1 Tablet by mouth 3 times a day as needed. (Patient not  taking: Reported on 12/8/2021) 30 Tablet 0   • Ivermectin 3 MG Tab Take 6 tablets daily x 5 days. With food in am. [facility] protocol (Patient not taking: Reported on 12/8/2021) 30 tablet 0   • benzonatate (TESSALON PERLES) 100 MG Cap Take 1 capsule by mouth 3 times a day as needed for Cough. (Patient not taking: Reported on 12/8/2021) 30 capsule 0     No current facility-administered medications for this visit.       Allergies:  Allergies   Allergen Reactions   • Melatonin      Other reaction(s): body aches       Family History:  Family History   Problem Relation Age of Onset   • Heart Disease Mother         CHF   • Arthritis Mother    • Heart Disease Father    • Diabetes Father        Social History:  Social History     Socioeconomic History   • Marital status:      Spouse name: Not on file   • Number of children: Not on file   • Years of education: Not on file   • Highest education level: Not on file   Occupational History   • Not on file   Tobacco Use   • Smoking status: Never Smoker   • Smokeless tobacco: Never Used   Vaping Use   • Vaping Use: Never used   Substance and Sexual Activity   • Alcohol use: No   • Drug use: No   • Sexual activity: Yes     Partners: Male   Other Topics Concern   • Not on file   Social History Narrative   • Not on file     Social Determinants of Health     Financial Resource Strain:    • Difficulty of Paying Living Expenses: Not on file   Food Insecurity:    • Worried About Running Out of Food in the Last Year: Not on file   • Ran Out of Food in the Last Year: Not on file   Transportation Needs:    • Lack of Transportation (Medical): Not on file   • Lack of Transportation (Non-Medical): Not on file   Physical Activity:    • Days of Exercise per Week: Not on file   • Minutes of Exercise per Session: Not on file   Stress:    • Feeling of Stress : Not on file   Social Connections:    • Frequency of Communication with Friends and Family: Not on file   • Frequency of Social Gatherings  with Friends and Family: Not on file   • Attends Alevism Services: Not on file   • Active Member of Clubs or Organizations: Not on file   • Attends Club or Organization Meetings: Not on file   • Marital Status: Not on file   Intimate Partner Violence:    • Fear of Current or Ex-Partner: Not on file   • Emotionally Abused: Not on file   • Physically Abused: Not on file   • Sexually Abused: Not on file   Housing Stability:    • Unable to Pay for Housing in the Last Year: Not on file   • Number of Places Lived in the Last Year: Not on file   • Unstable Housing in the Last Year: Not on file          Physical Exam:  Physical Exam    Oriented x 3  Weight/BMI: There is no height or weight on file to calculate BMI.  There were no vitals taken for this visit.    Base Eye Exam     Visual Acuity (Snellen - Linear)       Right Left    Dist cc 20/20 20/200 +1    Correction: Glasses          Tonometry ([facility], 8:46 AM)       Right Left    Pressure 13           Pupils       Pupils    Right PERRL    Left PERRL          Visual Fields       Right Left     Full Full          Neuro/Psych     Oriented x3: Yes    Mood/Affect: Normal            Strabismus Exam     Method: Krimsky    Correction: sc    Distance Near Near +3DS N Bifocals                    0 0 0   0 0 0                       0  0  LET 40 0  -4                       0 0 0   0 0 0                   Slit Lamp and Fundus Exam     External Exam       Right Left    External Normal Left peripheral 7th          Slit Lamp Exam       Right Left    Lids/Lashes Normal lower lid retraction     Conjunctiva/Sclera White and quiet inferior injection    Cornea Clear Clear    Anterior Chamber Deep and quiet Deep and quiet    Iris Round and reactive Round and reactive    Lens Clear Clear    Vitreous Normal Normal          Fundus Exam       Right Left    Disc Tilted disc Tilted disc    C/D Ratio 0.3 0.3    Macula Normal Normal    Vessels Normal Normal    Periphery Normal Normal             Refraction     Wearing Rx       Sphere Cylinder Axis    Right -0.50 +2.00 177    Left -0.75 +1.75 168    Age: 1yr    Type: SVL          Wearing Rx #2       Sphere Cylinder Axis    Right +2.25 +1.00 003    Left +1.25 +1.50 167    Type: SVL                Pertinent Lab/Test/Imaging Review:      Assessment and Plan:     Left abducens nerve palsy  12/8/2021 - Left 6h nerve palsy following removal of CP angle epidermoid cyst 12/12/2021 - Eye barely getting to the midline. Dicussed continuing to monitor and at this point since eye so deviated could not be a candidate for temporary prisms. Hopefully will show improvement over the next few months. Dicussed alterate patching. Currently wearing an occlusion patch over the left lens   1/19/2022 - No significant improvement in abduction deficit. Still cannot abduct to mid line. Dicussed would wait at least 8 months prior to consideration of strabismus repair. With tarsorrhaphy, discussed might need to take down to allow access to the muscles.     7th nerve palsy  12/8/2021 - Left 7th nerve palsy following CP angle epidermoid cyst removal 11/12/2021 at [facility]. Currently has significant lower lid retraction. Some bells, but inferior temporal chemosis an epithelial irregularity. Discussed changing to Lacrilube tid. Will refer to Dr Edwin Quesada in oculo plastics since would probably benefit from tarsorrhaphy.   1/19/2022 - Temporary tarsorrhaphy by Dr Quesada. Doing well. No significant corneal exposure today. Continue ocular lubricants    Epidermoid cyst of brain  12/8/2021 - obtained Oct NFl thickness that was normal at 96 Od and 95 OS. No evidence of optic nerve swelling from increased intracranial pressure        Dion Johnson M.D.

## 2022-01-19 NOTE — ASSESSMENT & PLAN NOTE
12/8/2021 - Left 6h nerve palsy following removal of CP angle epidermoid cyst 12/12/2021 - Eye barely getting to the midline. Dicussed continuing to monitor and at this point since eye so deviated could not be a candidate for temporary prisms. Hopefully will show improvement over the next few months. Dicussed alterate patching. Currently wearing an occlusion patch over the left lens   1/19/2022 - No significant improvement in abduction deficit. Still cannot abduct to mid line. Dicussed would wait at least 8 months prior to consideration of strabismus repair. With tarsorrhaphy, discussed might need to take down to allow access to the muscles.

## 2022-04-04 ENCOUNTER — OFFICE VISIT (OUTPATIENT)
Dept: OPHTHALMOLOGY | Facility: MEDICAL CENTER | Age: 72
End: 2022-04-04
Payer: MEDICARE

## 2022-04-04 DIAGNOSIS — H49.22 LEFT ABDUCENS NERVE PALSY: ICD-10-CM

## 2022-04-04 DIAGNOSIS — G51.0 7TH NERVE PALSY: ICD-10-CM

## 2022-04-04 DIAGNOSIS — G93.0 EPIDERMOID CYST OF BRAIN: ICD-10-CM

## 2022-04-04 PROCEDURE — 92060 SENSORIMOTOR EXAMINATION: CPT | Performed by: OPHTHALMOLOGY

## 2022-04-04 PROCEDURE — 99213 OFFICE O/P EST LOW 20 MIN: CPT | Performed by: OPHTHALMOLOGY

## 2022-04-04 ASSESSMENT — REFRACTION_MANIFEST
OD_SPHERE: +0.50
OD_CYLINDER: +1.75
OD_AXIS: 173

## 2022-04-04 ASSESSMENT — SLIT LAMP EXAM - LIDS: COMMENTS: NORMAL

## 2022-04-04 ASSESSMENT — REFRACTION_WEARINGRX
OD_CYLINDER: +2.00
OS_AXIS: 162
OD_SPHERE: -0.50
SPECS_TYPE: SVL
OS_SPHERE: -0.75
OS_CYLINDER: +1.75
OD_AXIS: 001

## 2022-04-04 ASSESSMENT — VISUAL ACUITY
METHOD: SNELLEN - LINEAR
OD_CC: 20/20
OS_PH_CC: 20/200
CORRECTION_TYPE: GLASSES
OS_CC: 20/60

## 2022-04-04 ASSESSMENT — EXTERNAL EXAM - RIGHT EYE: OD_EXAM: NORMAL

## 2022-04-04 ASSESSMENT — CONF VISUAL FIELD: OD_NORMAL: 1

## 2022-04-04 ASSESSMENT — ENCOUNTER SYMPTOMS: BLURRED VISION: 1

## 2022-04-04 NOTE — ASSESSMENT & PLAN NOTE
12/8/2021 - obtained Oct NFl thickness that was normal at 96 Od and 95 OS. No evidence of optic nerve swelling from increased intracranial pressure  4/4/2022 - Getting MRI done in may. Has some scalp sensitivity, especially when lying on the left side. discussed that could consider trial of Gabapentin or referral to pain service, but she wished to wait until after follow up MRI and discuss with neurosurgeon.

## 2022-04-04 NOTE — PROGRESS NOTES
Peds/Neuro Ophthalmology:   Dion Johnson M.D.    Date & Time note created:    4/4/2022   10:53 AM     Referring MD / APRN:  ANGELINA Gentile, No att. providers found    Patient ID:  Name:             Angelina Porras   YOB: 1950  Age:                 72 y.o.  female   MRN:               [id number]    Chief Complaint/Reason for Visit:     Other ([place] Palsy)      History of Present Illness:    Angelina Porras is a 72 y.o. female   Follow up [place] Palsy with left eye sown shut by .Patient using refresh PM.Pain behind left ear since brain surgery in same area.Patient saw  3 weeks ago and has next appointment 5-.      Review of Systems:  Review of Systems   Eyes: Positive for blurred vision.        Head pain   All other systems reviewed and are negative.      Past Medical History:   Past Medical History:   Diagnosis Date   • Arthritis    • Diplopia    • Epidermoid cyst    • Left-sided Bell's palsy        Past Surgical History:  Past Surgical History:   Procedure Laterality Date   • CRANIOTOMY  11/12/2021    Epidermoid cyst   • ARTHROPLASTY      total left knee   • EYE SURGERY Left 12/17/2022    Tarorrhaphy   • TONSILLECTOMY         Current Outpatient Medications:  Current Outpatient Medications   Medication Sig Dispense Refill   • Carboxymethylcellulose Sodium (REFRESH LIQUIGEL OP) Administer 1 Drop into affected eye(s).     • Carboxymethylcellulose Sodium (REFRESH TEARS OP) Administer  into affected eye(s).     • Sennosides (SENNA LAXATIVE PO) Take  by mouth.     • dexamethasone (DECADRON) 0.5 MG Tab  (Patient not taking: Reported on 1/19/2022)     • famotidine (PEPCID) 20 MG Tab 20 mg. (Patient not taking: Reported on 1/19/2022)     • meclizine (ANTIVERT) 25 MG Tab Take 1 Tablet by mouth 3 times a day as needed. (Patient not taking: No sig reported) 30 Tablet 0   • Ivermectin 3 MG Tab Take 6 tablets daily x 5 days. With food in am. [facility] protocol (Patient not  taking: No sig reported) 30 tablet 0   • benzonatate (TESSALON PERLES) 100 MG Cap Take 1 capsule by mouth 3 times a day as needed for Cough. (Patient not taking: Reported on 12/8/2021) 30 capsule 0     No current facility-administered medications for this visit.       Allergies:  Allergies   Allergen Reactions   • Melatonin      Other reaction(s): body aches       Family History:  Family History   Problem Relation Age of Onset   • Heart Disease Mother         CHF   • Arthritis Mother    • Heart Disease Father    • Diabetes Father        Social History:  Social History     Socioeconomic History   • Marital status:      Spouse name: Not on file   • Number of children: Not on file   • Years of education: Not on file   • Highest education level: Not on file   Occupational History   • Not on file   Tobacco Use   • Smoking status: Never Smoker   • Smokeless tobacco: Never Used   Vaping Use   • Vaping Use: Never used   Substance and Sexual Activity   • Alcohol use: No   • Drug use: No   • Sexual activity: Yes     Partners: Male   Other Topics Concern   • Not on file   Social History Narrative    retired     Social Determinants of Health     Financial Resource Strain: Not on file   Food Insecurity: Not on file   Transportation Needs: Not on file   Physical Activity: Not on file   Stress: Not on file   Social Connections: Not on file   Intimate Partner Violence: Not on file   Housing Stability: Not on file          Physical Exam:  Physical Exam    Oriented x 3  Weight/BMI: There is no height or weight on file to calculate BMI.  There were no vitals taken for this visit.    Base Eye Exam     Visual Acuity (Snellen - Linear)       Right Left    Dist cc 20/20 20/60    Dist ph cc  20/200    Correction: Glasses          Pupils       APD    Right reactive    Left           Visual Fields       Right Left     Full           Neuro/Psych     Oriented x3: Yes    Mood/Affect: Normal            Strabismus Exam     Method: Parisa     Correction: sc    Distance Near Near +3DS N Bifocals                    0 0 0   0 0 0                       0  0  Ortho  0  uncertain                       0 0 0   0 0 0                   Slit Lamp and Fundus Exam     External Exam       Right Left    External Normal Left peripheral 7th          Slit Lamp Exam       Right Left    Lids/Lashes Normal temporal lateral tarsorrophy    Conjunctiva/Sclera White and quiet inferior injection    Cornea Clear Clear    Anterior Chamber Deep and quiet Deep and quiet    Iris Round and reactive Round and reactive    Lens Clear Clear    Vitreous Normal Normal            Refraction     Wearing Rx       Sphere Cylinder Axis    Right -0.50 +2.00 001    Left -0.75 +1.75 162    Age: 1yrs    Type: SVL          Manifest Refraction       Sphere Cylinder Axis    Right +0.50 +1.75 173    Left                   Pertinent Lab/Test/Imaging Review:      Assessment and Plan:     7th nerve palsy  12/8/2021 - Left 7th nerve palsy following CP angle epidermoid cyst removal 11/12/2021 at [facility]. Currently has significant lower lid retraction. Some bells, but inferior temporal chemosis an epithelial irregularity. Discussed changing to Lacrilube tid. Will refer to Dr Edwin Quesada in oculo plastics since would probably benefit from tarsorrhaphy.   1/19/2022 - Temporary tarsorrhaphy by Dr Quesada. Doing well. No significant corneal exposure today. Continue ocular lubricants  4/4/2022 - No signficant corneal exposure. Saw Dr Quesada recently who wanted to leave the tarsorrhaphy in place. Has follow up May 11th    Left abducens nerve palsy  12/8/2021 - Left 6h nerve palsy following removal of CP angle epidermoid cyst 12/12/2021 - Eye barely getting to the midline. Dicussed continuing to monitor and at this point since eye so deviated could not be a candidate for temporary prisms. Hopefully will show improvement over the next few months. Dicussed alterate patching. Currently wearing an occlusion patch over the left  lens   1/19/2022 - No significant improvement in abduction deficit. Still cannot abduct to mid line. Dicussed would wait at least 8 months prior to consideration of strabismus repair. With tarsorrhaphy, discussed might need to take down to allow access to the muscles.   4/4/2022 - Significant spontaneous improvement, ortho in right gaze and midline. Cannot tell whether full abduction since covered by Tarsorrhaphy, but essentially resolved. Has some bells    Epidermoid cyst of brain  12/8/2021 - obtained Oct NFl thickness that was normal at 96 Od and 95 OS. No evidence of optic nerve swelling from increased intracranial pressure  4/4/2022 - Getting MRI done in may. Has some scalp sensitivity, especially when lying on the left side. discussed that could consider trial of Gabapentin or referral to pain service, but she wished to wait until after follow up MRI and discuss with neurosurgeon.         Dion Johnson M.D.

## 2022-04-04 NOTE — ASSESSMENT & PLAN NOTE
12/8/2021 - Left 6h nerve palsy following removal of CP angle epidermoid cyst 12/12/2021 - Eye barely getting to the midline. Dicussed continuing to monitor and at this point since eye so deviated could not be a candidate for temporary prisms. Hopefully will show improvement over the next few months. Dicussed alterate patching. Currently wearing an occlusion patch over the left lens   1/19/2022 - No significant improvement in abduction deficit. Still cannot abduct to mid line. Dicussed would wait at least 8 months prior to consideration of strabismus repair. With tarsorrhaphy, discussed might need to take down to allow access to the muscles.   4/4/2022 - Significant spontaneous improvement, ortho in right gaze and midline. Cannot tell whether full abduction since covered by Tarsorrhaphy, but essentially resolved. Has some bells

## 2022-04-04 NOTE — ASSESSMENT & PLAN NOTE
12/8/2021 - Left 7th nerve palsy following CP angle epidermoid cyst removal 11/12/2021 at [facility]. Currently has significant lower lid retraction. Some bells, but inferior temporal chemosis an epithelial irregularity. Discussed changing to Lacrilube tid. Will refer to Dr Edwin Quesada in oculo plastics since would probably benefit from tarsorrhaphy.   1/19/2022 - Temporary tarsorrhaphy by Dr Quesada. Doing well. No significant corneal exposure today. Continue ocular lubricants  4/4/2022 - No signficant corneal exposure. Saw Dr Quesada recently who wanted to leave the tarsorrhaphy in place. Has follow up May 11th

## 2022-04-19 PROBLEM — Z96.659 S/P KNEE REPLACEMENT: Status: RESOLVED | Noted: 2017-03-22 | Resolved: 2022-04-19

## 2022-08-16 ENCOUNTER — OFFICE VISIT (OUTPATIENT)
Dept: OPHTHALMOLOGY | Facility: MEDICAL CENTER | Age: 72
End: 2022-08-16
Payer: MEDICARE

## 2022-08-16 DIAGNOSIS — G51.0 7TH NERVE PALSY: ICD-10-CM

## 2022-08-16 DIAGNOSIS — G93.0 EPIDERMOID CYST OF BRAIN: ICD-10-CM

## 2022-08-16 DIAGNOSIS — H49.22 LEFT ABDUCENS NERVE PALSY: ICD-10-CM

## 2022-08-16 PROCEDURE — 99214 OFFICE O/P EST MOD 30 MIN: CPT | Performed by: OPHTHALMOLOGY

## 2022-08-16 ASSESSMENT — REFRACTION_WEARINGRX
SPECS_TYPE: SVL READERS
OS_CYLINDER: +1.50
OD_AXIS: 001
OS_CYLINDER: +1.75
OD_SPHERE: -0.25
OD_CYLINDER: +1.75
OS_AXIS: 171
SPECS_TYPE: SVL
OS_AXIS: 166
OD_SPHERE: +2.25
OS_SPHERE: +1.25
OS_SPHERE: -0.75
OD_AXIS: 179
OD_CYLINDER: +1.00

## 2022-08-16 ASSESSMENT — EXTERNAL EXAM - RIGHT EYE: OD_EXAM: NORMAL

## 2022-08-16 ASSESSMENT — SLIT LAMP EXAM - LIDS: COMMENTS: NORMAL

## 2022-08-16 ASSESSMENT — CONF VISUAL FIELD
OD_INFERIOR_NASAL_RESTRICTION: 0
OS_SUPERIOR_NASAL_RESTRICTION: 0
OS_INFERIOR_TEMPORAL_RESTRICTION: 0
OD_SUPERIOR_TEMPORAL_RESTRICTION: 0
OD_SUPERIOR_NASAL_RESTRICTION: 0
OS_SUPERIOR_TEMPORAL_RESTRICTION: 0
OS_INFERIOR_NASAL_RESTRICTION: 0
OS_NORMAL: 1
OD_NORMAL: 1
OD_INFERIOR_TEMPORAL_RESTRICTION: 0

## 2022-08-16 ASSESSMENT — VISUAL ACUITY
OS_CC: J2
OS_CC+: -2
OD_CC: 20/20
METHOD: SNELLEN - LINEAR
OD_CC: J2
CORRECTION_TYPE: GLASSES
OS_CC: 20/50

## 2022-08-16 ASSESSMENT — TONOMETRY
OS_IOP_MMHG: 14
OD_IOP_MMHG: 15
IOP_METHOD: I-CARE

## 2022-08-16 ASSESSMENT — REFRACTION_MANIFEST
OD_AXIS: 170
OS_SPHERE: -1.25
OD_CYLINDER: +1.75
OS_AXIS: 172
OS_CYLINDER: +2.00
METHOD_AUTOREFRACTION: 1
OD_SPHERE: +0.25

## 2022-08-16 NOTE — ASSESSMENT & PLAN NOTE
12/8/2021 - Left 6h nerve palsy following removal of CP angle epidermoid cyst 12/12/2021 - Eye barely getting to the midline. Dicussed continuing to monitor and at this point since eye so deviated could not be a candidate for temporary prisms. Hopefully will show improvement over the next few months. Dicussed alterate patching. Currently wearing an occlusion patch over the left lens   1/19/2022 - No significant improvement in abduction deficit. Still cannot abduct to mid line. Dicussed would wait at least 8 months prior to consideration of strabismus repair. With tarsorrhaphy, discussed might need to take down to allow access to the muscles.   4/4/2022 - Significant spontaneous improvement, ortho in right gaze and midline. Cannot tell whether full abduction since covered by Tarsorrhaphy, but essentially resolved. Has some bells  8/16/2022 - ortho today

## 2022-08-16 NOTE — ASSESSMENT & PLAN NOTE
12/8/2021 - Left 7th nerve palsy following CP angle epidermoid cyst removal 11/12/2021 at [facility]. Currently has significant lower lid retraction. Some bells, but inferior temporal chemosis an epithelial irregularity. Discussed changing to Lacrilube tid. Will refer to Dr Edwin Quesada in oculo plastics since would probably benefit from tarsorrhaphy.   1/19/2022 - Temporary tarsorrhaphy by Dr Quesada. Doing well. No significant corneal exposure today. Continue ocular lubricants  4/4/2022 - No signficant corneal exposure. Saw Dr Quesada recently who wanted to leave the tarsorrhaphy in place. Has follow up May 11th  8/16/2022 - Tarsorrhaphy removed. With forced eyelid closure, covers cornea. Some bells. No corneal stain. Is using AT q2 hours, dicussed that could begin to decrease frequency.

## 2022-08-16 NOTE — PROGRESS NOTES
Peds/Neuro Ophthalmology:   Dion Johnson M.D.    Date & Time note created:    8/16/2022   1:52 PM     Referring MD / APRN:  ANGELINA Gentile, No att. providers found    Patient ID:  Name:             Angelina Porras   YOB: 1950  Age:                 72 y.o.  female   MRN:               [id number]    Chief Complaint/Reason for Visit:     Other (4 month follow up for 7th nerve palsy)      History of Present Illness:    Angelina Porras is a 72 y.o. female   4 month follow up for 7th nerve palsy. Pt states vision seems better since last eye exam. Pt does get a shooting pain above the left eye at night sometimes. Pt also has itchy eyes in both eyes only experiences this at night. Pt does uses refresh drops every hour and at night uses refresh gel.       Review of Systems:  Review of Systems   Eyes:         7th nerve palsy  Left abducens nerve palsy     Neurological:         Epidermoid cyst of brain   All other systems reviewed and are negative.    Past Medical History:   Past Medical History:   Diagnosis Date    Arthritis     Diplopia     Epidermoid cyst     Left-sided Bell's palsy        Past Surgical History:  Past Surgical History:   Procedure Laterality Date    CRANIOTOMY  11/12/2021    Epidermoid cyst    ARTHROPLASTY      total left knee    EYE SURGERY Left 12/17/2022    Tarorrhaphy    TONSILLECTOMY         Current Outpatient Medications:  Current Outpatient Medications   Medication Sig Dispense Refill    HYDROcodone-acetaminophen (NORCO) 5-325 MG Tab per tablet Take 1 Tablet by mouth every 6 hours as needed (neck and hip pain.) for up to 30 days. 30 Tablet 0    Sennosides (SENNA) 8.6 MG Tab Take 1 Tablet by mouth at bedtime. 14 Tablet 1    promethazine-dextromethorphan (PROMETHAZINE-DM) 6.25-15 MG/5ML syrup Take 5 mL by mouth every four hours as needed for Cough. 240 mL 0    ofloxacin (OCUFLOX) 0.3 % Solution INSTILL 1 DROP IN LEFT EYE FOUR TIMES DAILY      erythromycin 5  MG/GM Ointment Apply  to affected eye(s).       No current facility-administered medications for this visit.       Allergies:  Allergies   Allergen Reactions    Melatonin      Other reaction(s): body aches    Codeine      Other reaction(s): Constipation       Family History:  Family History   Problem Relation Age of Onset    Heart Disease Mother         CHF    Arthritis Mother     Heart Disease Father     Diabetes Father        Social History:  Social History     Socioeconomic History    Marital status:      Spouse name: Not on file    Number of children: Not on file    Years of education: Not on file    Highest education level: Not on file   Occupational History    Not on file   Tobacco Use    Smoking status: Never    Smokeless tobacco: Never   Vaping Use    Vaping Use: Never used   Substance and Sexual Activity    Alcohol use: No    Drug use: No    Sexual activity: Yes     Partners: Male   Other Topics Concern    Not on file   Social History Narrative    retired     Social Determinants of Health     Financial Resource Strain: Not on file   Food Insecurity: Not on file   Transportation Needs: Not on file   Physical Activity: Not on file   Stress: Not on file   Social Connections: Not on file   Intimate Partner Violence: Not on file   Housing Stability: Not on file          Physical Exam:  Physical Exam    Oriented x 3  Weight/BMI: There is no height or weight on file to calculate BMI.  There were no vitals taken for this visit.    Base Eye Exam       Visual Acuity (Snellen - Linear)         Right Left    Dist cc 20/20 20/50 -2    Dist ph cc NI NI    Near cc J2 J2      Correction: Glasses              Tonometry (i-care, 9:33 AM)         Right Left    Pressure 15 14              Pupils         APD    Right     Left Trace              Visual Fields         Right Left     Full Full              Neuro/Psych       Oriented x3: Yes    Mood/Affect: Normal                  Strabismus Exam       Method: Parisa     Correction: sc      Distance Near Near +3DS N Bifocals                      0 0 0   0 0 0                       0  0  Ortho  0  0                       0 0 0   0 0 0                       Slit Lamp and Fundus Exam       External Exam         Right Left    External Normal Left peripheral 7th              Slit Lamp Exam         Right Left    Lids/Lashes Normal slight lid lag    Conjunctiva/Sclera White and quiet inferior injection    Cornea Clear Clear    Anterior Chamber Deep and quiet Deep and quiet    Iris Round and reactive Round and reactive    Lens Clear Clear    Vitreous Normal Normal                  Refraction       Wearing Rx         Sphere Cylinder Axis    Right -0.25 +1.75 001    Left -0.75 +1.75 171      Age: 1yr    Type: SVL              Wearing Rx #2         Sphere Cylinder Axis    Right +2.25 +1.00 179    Left +1.25 +1.50 166      Age: 1yr    Type: SVL readers              Manifest Refraction (Auto)         Sphere Cylinder Axis    Right +0.25 +1.75 170    Left -1.25 +2.00 172                    Pertinent Lab/Test/Imaging Review:      Assessment and Plan:     Left abducens nerve palsy  12/8/2021 - Left 6h nerve palsy following removal of CP angle epidermoid cyst 12/12/2021 - Eye barely getting to the midline. Dicussed continuing to monitor and at this point since eye so deviated could not be a candidate for temporary prisms. Hopefully will show improvement over the next few months. Dicussed alterate patching. Currently wearing an occlusion patch over the left lens   1/19/2022 - No significant improvement in abduction deficit. Still cannot abduct to mid line. Dicussed would wait at least 8 months prior to consideration of strabismus repair. With tarsorrhaphy, discussed might need to take down to allow access to the muscles.   4/4/2022 - Significant spontaneous improvement, ortho in right gaze and midline. Cannot tell whether full abduction since covered by Tarsorrhaphy, but essentially resolved. Has some  bells  8/16/2022 - ortho today    7th nerve palsy  12/8/2021 - Left 7th nerve palsy following CP angle epidermoid cyst removal 11/12/2021 at [facility]. Currently has significant lower lid retraction. Some bells, but inferior temporal chemosis an epithelial irregularity. Discussed changing to Lacrilube tid. Will refer to Dr Edwin Quesada in oculo plastics since would probably benefit from tarsorrhaphy.   1/19/2022 - Temporary tarsorrhaphy by Dr Quesada. Doing well. No significant corneal exposure today. Continue ocular lubricants  4/4/2022 - No signficant corneal exposure. Saw Dr Quesada recently who wanted to leave the tarsorrhaphy in place. Has follow up May 11th  8/16/2022 - Tarsorrhaphy removed. With forced eyelid closure, covers cornea. Some bells. No corneal stain. Is using AT q2 hours, dicussed that could begin to decrease frequency.     Epidermoid cyst of brain  12/8/2021 - obtained Oct NFl thickness that was normal at 96 Od and 95 OS. No evidence of optic nerve swelling from increased intracranial pressure  4/4/2022 - Getting MRI done in may. Has some scalp sensitivity, especially when lying on the left side. discussed that could consider trial of Gabapentin or referral to pain service, but she wished to wait until after follow up MRI and discuss with neurosurgeon.   8/16/2022 - MRI 5/12/2022 -  Interval postsurgical changes of left occipital craniotomy for debulking of left pontine epidermoid cyst which is decreased in size. 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 Surgery done by Dr Daly at [facility]. Wish's to establish neurosurgeon here in [place]. Will refer to Dr All Johnson M.D.

## 2022-08-16 NOTE — ASSESSMENT & PLAN NOTE
12/8/2021 - obtained Oct NFl thickness that was normal at 96 Od and 95 OS. No evidence of optic nerve swelling from increased intracranial pressure  4/4/2022 - Getting MRI done in may. Has some scalp sensitivity, especially when lying on the left side. discussed that could consider trial of Gabapentin or referral to pain service, but she wished to wait until after follow up MRI and discuss with neurosurgeon.   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 Surgery done by Dr Daly at [facility]. Wish's to establish neurosurgeon here in [place]. Will refer to Dr All Baez

## 2022-11-29 PROBLEM — R79.89 ELEVATED LFTS: Status: RESOLVED | Noted: 2019-06-27 | Resolved: 2022-11-29

## 2022-11-29 PROBLEM — Z98.890 STATUS POST CRANIOTOMY: Status: ACTIVE | Noted: 2022-11-29

## 2022-11-29 PROBLEM — H90.5 SENSORINEURAL HEARING LOSS (SNHL) OF LEFT EAR: Status: ACTIVE | Noted: 2022-11-29

## 2022-11-29 PROBLEM — G93.0 EPIDERMOID CYST OF BRAIN: Status: RESOLVED | Noted: 2021-12-08 | Resolved: 2022-11-29

## 2023-01-19 PROBLEM — M16.12 PRIMARY OSTEOARTHRITIS OF LEFT HIP: Status: ACTIVE | Noted: 2023-01-19

## 2023-01-19 PROBLEM — Z76.89 ENCOUNTER TO ESTABLISH CARE WITH NEW DOCTOR: Status: ACTIVE | Noted: 2023-01-19

## 2023-03-01 ENCOUNTER — OFFICE VISIT (OUTPATIENT)
Dept: OPHTHALMOLOGY | Facility: MEDICAL CENTER | Age: 73
End: 2023-03-01
Payer: MEDICARE

## 2023-03-01 DIAGNOSIS — G51.0 7TH NERVE PALSY: ICD-10-CM

## 2023-03-01 DIAGNOSIS — H90.42 SENSORINEURAL HEARING LOSS (SNHL) OF LEFT EAR WITH UNRESTRICTED HEARING OF RIGHT EAR: ICD-10-CM

## 2023-03-01 DIAGNOSIS — G93.0 EPIDERMOID CYST OF BRAIN: ICD-10-CM

## 2023-03-01 DIAGNOSIS — H49.22 LEFT ABDUCENS NERVE PALSY: ICD-10-CM

## 2023-03-01 DIAGNOSIS — H40.003 GLAUCOMA SUSPECT OF BOTH EYES: ICD-10-CM

## 2023-03-01 PROCEDURE — 92250 FUNDUS PHOTOGRAPHY W/I&R: CPT | Performed by: OPHTHALMOLOGY

## 2023-03-01 PROCEDURE — 99214 OFFICE O/P EST MOD 30 MIN: CPT | Mod: 25 | Performed by: OPHTHALMOLOGY

## 2023-03-01 ASSESSMENT — REFRACTION_WEARINGRX
OS_SPHERE: -0.75
OS_CYLINDER: +1.75
OD_SPHERE: -0.25
OD_AXIS: 177
SPECS_TYPE: SVL
OS_AXIS: 165
OD_CYLINDER: +2.00

## 2023-03-01 ASSESSMENT — CUP TO DISC RATIO
OS_RATIO: 0.2
OD_RATIO: 0.2

## 2023-03-01 ASSESSMENT — VISUAL ACUITY
OS_CC: J1
OD_CC: 20/20
OD_CC: J1
CORRECTION_TYPE: GLASSES
OS_CC: 20/20
METHOD: SNELLEN - LINEAR

## 2023-03-01 ASSESSMENT — CONF VISUAL FIELD
OD_INFERIOR_TEMPORAL_RESTRICTION: 0
OD_SUPERIOR_NASAL_RESTRICTION: 0
OS_SUPERIOR_NASAL_RESTRICTION: 0
OS_INFERIOR_TEMPORAL_RESTRICTION: 0
OD_INFERIOR_NASAL_RESTRICTION: 0
OS_SUPERIOR_TEMPORAL_RESTRICTION: 0
OD_SUPERIOR_TEMPORAL_RESTRICTION: 0
OS_NORMAL: 1
OD_NORMAL: 1
OS_INFERIOR_NASAL_RESTRICTION: 0

## 2023-03-01 ASSESSMENT — REFRACTION_MANIFEST
OD_AXIS: 177
OS_CYLINDER: +2.00
OD_CYLINDER: +1.75
OS_SPHERE: -0.75
OS_AXIS: 168
OD_SPHERE: +0.50
METHOD_AUTOREFRACTION: 1

## 2023-03-01 ASSESSMENT — TONOMETRY
OS_IOP_MMHG: 16
OD_IOP_MMHG: 15

## 2023-03-01 ASSESSMENT — SLIT LAMP EXAM - LIDS: COMMENTS: NORMAL

## 2023-03-01 ASSESSMENT — ENCOUNTER SYMPTOMS: DIZZINESS: 1

## 2023-03-01 ASSESSMENT — EXTERNAL EXAM - RIGHT EYE: OD_EXAM: NORMAL

## 2023-03-01 NOTE — ASSESSMENT & PLAN NOTE
12/8/2021 - Left 6h nerve palsy following removal of CP angle epidermoid cyst 12/12/2021 - Eye barely getting to the midline. Dicussed continuing to monitor and at this point since eye so deviated could not be a candidate for temporary prisms. Hopefully will show improvement over the next few months. Dicussed alterate patching. Currently wearing an occlusion patch over the left lens   1/19/2022 - No significant improvement in abduction deficit. Still cannot abduct to mid line. Dicussed would wait at least 8 months prior to consideration of strabismus repair. With tarsorrhaphy, discussed might need to take down to allow access to the muscles.   4/4/2022 - Significant spontaneous improvement, ortho in right gaze and midline. Cannot tell whether full abduction since covered by Tarsorrhaphy, but essentially resolved. Has some bells  8/16/2022 - ortho today  3/1/2023 - ortho

## 2023-03-01 NOTE — ASSESSMENT & PLAN NOTE
12/8/2021 - obtained Oct NFl thickness that was normal at 96 Od and 95 OS. No evidence of optic nerve swelling from increased intracranial pressure  4/4/2022 - Getting MRI done in may. Has some scalp sensitivity, especially when lying on the left side. discussed that could consider trial of Gabapentin or referral to pain service, but she wished to wait until after follow up MRI and discuss with neurosurgeon.   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 Surgery done by Dr Daly at [facility]. Wish's to establish neurosurgeon here in [place]. Will refer to Dr All Garcia  3/1/2023 - 1/19/2023 - MRI Postsurgical changes of prior left occipital craniotomy for left pontine epidermoid cyst debulking. 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   2.  Unchanged small extra-axial fluid collection along the left cerebellum.  - had discussion with neurosurgeon at [facility].  Had virtual conference with neurosurgery.  However he is having some episodes recently of dizziness when awakening in the morning lasting a few hours.  She had the symptoms right after her initial surgical procedure but then they resolved.  Recommended she recontact neurosurgery.  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  Discussed possibly not lying as flat overnight.  There is no evidence of papilledema which would suggest increased intracranial pressure at this time.  OCT nerve fiber layer thickness 99 OD 98 OS

## 2023-03-01 NOTE — PROGRESS NOTES
Peds/Neuro Ophthalmology:   Dion Johnson M.D.    Date & Time note created:    3/1/2023   3:22 PM     Referring MD / APRN:  JUSTEN Ramires, No att. providers found    Patient ID:  Name:             Angelina Porras   YOB: 1950  Age:                 73 y.o.  female   MRN:               [id number]    Chief Complaint/Reason for Visit:     Other (Left abducens nerve palsy)      History of Present Illness:    Angelina Porras is a 73 y.o. female   Follow up left abducens nerve palsy.Patient feels droop left eye is better but seems a little worse when tired.Patient says she has dizziness when getting up in the morning and in the dark.x 10 days.+ dry mouth since brain surgery on 11-.Patient using tears for comfort of eyes.      Review of Systems:  Review of Systems   Eyes:         Droopy left eyelid   Neurological:  Positive for dizziness.   All other systems reviewed and are negative.    Past Medical History:   Past Medical History:   Diagnosis Date    Arthritis     Diplopia     Epidermoid cyst     Left-sided Bell's palsy        Past Surgical History:  Past Surgical History:   Procedure Laterality Date    CRANIOTOMY  11/12/2021    Epidermoid cyst    ARTHROPLASTY      total left knee    EYE SURGERY Left 12/17/2022    Tarorrhaphy    TONSILLECTOMY         Current Outpatient Medications:  Current Outpatient Medications   Medication Sig Dispense Refill    HYDROcodone-acetaminophen (NORCO) 5-325 MG Tab per tablet TAKE 1 TABLET BY MOUTH EVERY 4 HOURS AS NEEDED FOR PAIN FOR 2 DAYS (Patient not taking: Reported on 3/1/2023)      Sennosides (SENNA) 8.6 MG Tab Take 1 Tablet by mouth at bedtime. (Patient not taking: Reported on 3/1/2023) 14 Tablet 1     No current facility-administered medications for this visit.       Allergies:  Allergies   Allergen Reactions    Melatonin      Other reaction(s): body aches    Codeine      Other reaction(s): Constipation       Family History:  Family  History   Problem Relation Age of Onset    Heart Disease Mother         CHF    Arthritis Mother     Heart Disease Father     Diabetes Father        Social History:  Social History     Socioeconomic History    Marital status:      Spouse name: Not on file    Number of children: Not on file    Years of education: Not on file    Highest education level: Not on file   Occupational History    Not on file   Tobacco Use    Smoking status: Never    Smokeless tobacco: Never   Vaping Use    Vaping Use: Never used   Substance and Sexual Activity    Alcohol use: No    Drug use: No    Sexual activity: Yes     Partners: Male   Other Topics Concern    Not on file   Social History Narrative    retired     Social Determinants of Health     Financial Resource Strain: Low Risk     Difficulty of Paying Living Expenses: Not hard at all   Food Insecurity: No Food Insecurity    Worried About Running Out of Food in the Last Year: Never true    Ran Out of Food in the Last Year: Never true   Transportation Needs: No Transportation Needs    Lack of Transportation (Medical): No    Lack of Transportation (Non-Medical): No   Physical Activity: Sufficiently Active    Days of Exercise per Week: 7 days    Minutes of Exercise per Session: 90 min   Stress: No Stress Concern Present    Feeling of Stress : Not at all   Social Connections: Socially Integrated    Frequency of Communication with Friends and Family: More than three times a week    Frequency of Social Gatherings with Friends and Family: Three times a week    Attends Judaism Services: More than 4 times per year    Active Member of Clubs or Organizations: Yes    Attends Club or Organization Meetings: More than 4 times per year    Marital Status:    Intimate Partner Violence: Not At Risk    Fear of Current or Ex-Partner: No    Emotionally Abused: No    Physically Abused: No    Sexually Abused: No   Housing Stability: Low Risk     Unable to Pay for Housing in the Last Year: No     Number of Places Lived in the Last Year: 1    Unstable Housing in the Last Year: No          Physical Exam:  Physical Exam    Oriented x 3  Weight/BMI: There is no height or weight on file to calculate BMI.  There were no vitals taken for this visit.    Base Eye Exam       Visual Acuity (Snellen - Linear)         Right Left    Dist cc 20/20 20/20    Near cc J1 J1      Correction: Glasses              Tonometry (i care, 1:51 PM)         Right Left    Pressure 15 16              Pupils         Pupils APD    Right PERRL     Left PERRL +1              Visual Fields         Right Left     Full Full              Extraocular Movement         Right Left     Full, Ortho Full, Ortho              Neuro/Psych       Oriented x3: Yes    Mood/Affect: Normal                  Additional Tests       Color         Right Left    Ishihara 9/9 9/9              Stereo       Fly: +    Animals: 3/3    Circles: 9/9                  Slit Lamp and Fundus Exam       External Exam         Right Left    External Normal Left peripheral 7th              Slit Lamp Exam         Right Left    Lids/Lashes Normal slight lid lag    Conjunctiva/Sclera White and quiet no inferior stain    Cornea Clear Clear    Anterior Chamber Deep and quiet Deep and quiet    Iris Round and reactive Round and reactive    Lens Clear Clear    Vitreous Normal Normal              Fundus Exam         Right Left    Disc Normal Normal    C/D Ratio 0.2 0.2    Macula Normal Normal    Vessels Normal Normal    Periphery Normal Normal                  Refraction       Wearing Rx         Sphere Cylinder Axis    Right -0.25 +2.00 177    Left -0.75 +1.75 165      Age: 1yrs    Type: SVL              Manifest Refraction (Auto)         Sphere Cylinder Axis    Right +0.50 +1.75 177    Left -0.75 +2.00 168                    Pertinent Lab/Test/Imaging Review:      Assessment and Plan:     Left abducens nerve palsy  12/8/2021 - Left 6h nerve palsy following removal of CP angle epidermoid  cyst 12/12/2021 - Eye barely getting to the midline. Dicussed continuing to monitor and at this point since eye so deviated could not be a candidate for temporary prisms. Hopefully will show improvement over the next few months. Dicussed alterate patching. Currently wearing an occlusion patch over the left lens   1/19/2022 - No significant improvement in abduction deficit. Still cannot abduct to mid line. Dicussed would wait at least 8 months prior to consideration of strabismus repair. With tarsorrhaphy, discussed might need to take down to allow access to the muscles.   4/4/2022 - Significant spontaneous improvement, ortho in right gaze and midline. Cannot tell whether full abduction since covered by Tarsorrhaphy, but essentially resolved. Has some bells  8/16/2022 - ortho today  3/1/2023 - ortho    Sensorineural hearing loss (SNHL) of left ear  3/1/2023 - still cannot hear out of th left ear    Epidermoid cyst of brain  12/8/2021 - obtained Oct NFl thickness that was normal at 96 Od and 95 OS. No evidence of optic nerve swelling from increased intracranial pressure  4/4/2022 - Getting MRI done in may. Has some scalp sensitivity, especially when lying on the left side. discussed that could consider trial of Gabapentin or referral to pain service, but she wished to wait until after follow up MRI and discuss with neurosurgeon.   8/16/2022 - MRI 5/12/2022 -  Interval postsurgical changes of left occipital craniotomy for debulking of left pontine epidermoid cyst which is decreased in size. 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 Surgery done by Dr Daly at [facility]. Wish's to establish neurosurgeon here in [place]. Will refer to Dr All Garcia  3/1/2023 - 1/19/2023 - MRI Postsurgical changes of prior left occipital craniotomy for left pontine epidermoid cyst debulking. 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   2.   Unchanged small extra-axial fluid collection along the left cerebellum.  - had discussion with neurosurgeon at [facility].  Had virtual conference with neurosurgery.  However he is having some episodes recently of dizziness when awakening in the morning lasting a few hours.  She had the symptoms right after her initial surgical procedure but then they resolved.  Recommended she recontact neurosurgery.  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  Discussed possibly not lying as flat overnight.  There is no evidence of papilledema which would suggest increased intracranial pressure at this time.  OCT nerve fiber layer thickness 99 OD 98 OS      7th nerve palsy  12/8/2021 - Left 7th nerve palsy following CP angle epidermoid cyst removal 11/12/2021 at [facility]. Currently has significant lower lid retraction. Some bells, but inferior temporal chemosis an epithelial irregularity. Discussed changing to Lacrilube tid. Will refer to Dr Edwin Quesada in oculo plastics since would probably benefit from tarsorrhaphy.   1/19/2022 - Temporary tarsorrhaphy by Dr Quesada. Doing well. No significant corneal exposure today. Continue ocular lubricants  4/4/2022 - No signficant corneal exposure. Saw Dr Quesada recently who wanted to leave the tarsorrhaphy in place. Has follow up May 11th  8/16/2022 - Tarsorrhaphy removed. With forced eyelid closure, covers cornea. Some bells. No corneal stain. Is using AT q2 hours, dicussed that could begin to decrease frequency.   3/1/2023-using artificial tears frequently.  Improve lid closure.  No corneal stain.  Discussed could start decreasing frequency slightly of artificial tears        Dion Johnson M.D.

## 2023-03-01 NOTE — ASSESSMENT & PLAN NOTE
12/8/2021 - Left 7th nerve palsy following CP angle epidermoid cyst removal 11/12/2021 at [facility]. Currently has significant lower lid retraction. Some bells, but inferior temporal chemosis an epithelial irregularity. Discussed changing to Lacrilube tid. Will refer to Dr Edwin Quesada in oculo plastics since would probably benefit from tarsorrhaphy.   1/19/2022 - Temporary tarsorrhaphy by Dr Quesada. Doing well. No significant corneal exposure today. Continue ocular lubricants  4/4/2022 - No signficant corneal exposure. Saw Dr Quesada recently who wanted to leave the tarsorrhaphy in place. Has follow up May 11th  8/16/2022 - Tarsorrhaphy removed. With forced eyelid closure, covers cornea. Some bells. No corneal stain. Is using AT q2 hours, dicussed that could begin to decrease frequency.   3/1/2023-using artificial tears frequently.  Improve lid closure.  No corneal stain.  Discussed could start decreasing frequency slightly of artificial tears

## 2023-05-04 PROBLEM — H91.90 LOSS OF HEARING: Status: ACTIVE | Noted: 2023-05-04

## 2023-05-04 PROBLEM — M19.90 OSTEOARTHROSIS: Status: ACTIVE | Noted: 2023-05-04

## 2023-05-04 PROBLEM — T14.8XXA INJURY TO NERVES: Status: ACTIVE | Noted: 2023-05-04

## 2023-05-04 PROBLEM — G51.0 BELL'S PALSY: Status: ACTIVE | Noted: 2023-05-04

## 2023-05-04 PROBLEM — Z96.642 STATUS POST TOTAL HIP REPLACEMENT, LEFT: Status: ACTIVE | Noted: 2023-04-14

## 2023-05-04 PROBLEM — M25.569 KNEE PAIN: Status: ACTIVE | Noted: 2023-05-04

## 2023-05-04 PROBLEM — Z09 HOSPITAL DISCHARGE FOLLOW-UP: Status: ACTIVE | Noted: 2023-05-04

## 2023-08-23 PROBLEM — R42 DIZZINESS: Status: ACTIVE | Noted: 2023-08-23

## 2023-08-23 PROBLEM — Z00.00 MEDICARE ANNUAL WELLNESS VISIT, SUBSEQUENT: Status: ACTIVE | Noted: 2023-08-23

## 2023-08-24 PROBLEM — D64.9 ANEMIA OF UNKNOWN ETIOLOGY: Status: ACTIVE | Noted: 2023-08-24

## 2023-08-24 PROBLEM — R73.9 HYPERGLYCEMIA: Status: ACTIVE | Noted: 2023-08-24

## 2023-08-24 PROBLEM — Z13.29 SCREENING FOR THYROID DISORDER: Status: ACTIVE | Noted: 2023-08-24

## 2023-08-24 PROBLEM — Z79.899 HIGH RISK MEDICATION USE: Status: ACTIVE | Noted: 2023-08-24

## 2023-09-08 PROBLEM — E78.00 HYPERCHOLESTEROLEMIA: Status: ACTIVE | Noted: 2023-09-08

## 2023-10-11 ENCOUNTER — OFFICE VISIT (OUTPATIENT)
Dept: OPHTHALMOLOGY | Facility: MEDICAL CENTER | Age: 73
End: 2023-10-11
Payer: MEDICARE

## 2023-10-11 DIAGNOSIS — H46.9 OPTIC NEUROPATHY: ICD-10-CM

## 2023-10-11 DIAGNOSIS — G51.0 7TH NERVE PALSY: ICD-10-CM

## 2023-10-11 DIAGNOSIS — H40.003 GLAUCOMA SUSPECT OF BOTH EYES: ICD-10-CM

## 2023-10-11 DIAGNOSIS — H49.22 LEFT ABDUCENS NERVE PALSY: ICD-10-CM

## 2023-10-11 DIAGNOSIS — G93.0 EPIDERMOID CYST OF BRAIN: ICD-10-CM

## 2023-10-11 DIAGNOSIS — H25.13 AGE-RELATED NUCLEAR CATARACT OF BOTH EYES: ICD-10-CM

## 2023-10-11 DIAGNOSIS — H90.42 SENSORINEURAL HEARING LOSS (SNHL) OF LEFT EAR WITH UNRESTRICTED HEARING OF RIGHT EAR: ICD-10-CM

## 2023-10-11 PROCEDURE — 92250 FUNDUS PHOTOGRAPHY W/I&R: CPT | Performed by: OPHTHALMOLOGY

## 2023-10-11 PROCEDURE — 99214 OFFICE O/P EST MOD 30 MIN: CPT | Mod: 25 | Performed by: OPHTHALMOLOGY

## 2023-10-11 ASSESSMENT — VISUAL ACUITY
OD_CC: J1
METHOD: SNELLEN - LINEAR
OS_CC: J1
OD_CC: 20/20-2
OS_CC: 20/30
CORRECTION_TYPE: GLASSES

## 2023-10-11 ASSESSMENT — REFRACTION_MANIFEST
OS_AXIS: 169
METHOD_AUTOREFRACTION: 1
OD_AXIS: 175
OD_CYLINDER: +2.00
OS_CYLINDER: +1.75
OS_SPHERE: -0.75
OD_SPHERE: +0.50

## 2023-10-11 ASSESSMENT — REFRACTION_WEARINGRX
SPECS_TYPE: SVL
OD_SPHERE: -0.50
OD_AXIS: 177
OD_CYLINDER: +2.00
OS_SPHERE: -0.75
OS_AXIS: 167
OS_CYLINDER: +1.75

## 2023-10-11 ASSESSMENT — TONOMETRY
OD_IOP_MMHG: 14
OS_IOP_MMHG: 16

## 2023-10-11 ASSESSMENT — CUP TO DISC RATIO
OD_RATIO: 0.2
OS_RATIO: 0.2

## 2023-10-11 ASSESSMENT — EXTERNAL EXAM - RIGHT EYE: OD_EXAM: NORMAL

## 2023-10-11 ASSESSMENT — ENCOUNTER SYMPTOMS: BLURRED VISION: 1

## 2023-10-11 ASSESSMENT — SLIT LAMP EXAM - LIDS: COMMENTS: NORMAL

## 2023-10-11 NOTE — ASSESSMENT & PLAN NOTE
12/8/2021 - Left 6h nerve palsy following removal of CP angle epidermoid cyst 12/12/2021 - Eye barely getting to the midline. Dicussed continuing to monitor and at this point since eye so deviated could not be a candidate for temporary prisms. Hopefully will show improvement over the next few months. Dicussed alterate patching. Currently wearing an occlusion patch over the left lens   1/19/2022 - No significant improvement in abduction deficit. Still cannot abduct to mid line. Dicussed would wait at least 8 months prior to consideration of strabismus repair. With tarsorrhaphy, discussed might need to take down to allow access to the muscles.   4/4/2022 - Significant spontaneous improvement, ortho in right gaze and midline. Cannot tell whether full abduction since covered by Tarsorrhaphy, but essentially resolved. Has some bells  8/16/2022 - ortho today  3/1/2023 - ortho  10/11/2023-orthotropic

## 2023-10-11 NOTE — ASSESSMENT & PLAN NOTE
12/8/2021 - Left 7th nerve palsy following CP angle epidermoid cyst removal 11/12/2021 at [facility]. Currently has significant lower lid retraction. Some bells, but inferior temporal chemosis an epithelial irregularity. Discussed changing to Lacrilube tid. Will refer to Dr Edwin Quesada in oculo plastics since would probably benefit from tarsorrhaphy.   1/19/2022 - Temporary tarsorrhaphy by Dr Quesada. Doing well. No significant corneal exposure today. Continue ocular lubricants  4/4/2022 - No signficant corneal exposure. Saw Dr Quesada recently who wanted to leave the tarsorrhaphy in place. Has follow up May 11th  8/16/2022 - Tarsorrhaphy removed. With forced eyelid closure, covers cornea. Some bells. No corneal stain. Is using AT q2 hours, dicussed that could begin to decrease frequency.   3/1/2023-using artificial tears frequently.  Improve lid closure.  No corneal stain.  Discussed could start decreasing frequency slightly of artificial tears  10/11/2023-going to [place] for their Bell's palsy clinic.  Considering reinnervation surgery

## 2023-10-11 NOTE — PROGRESS NOTES
Peds/Neuro Ophthalmology:   Dion Johnson M.D.    Date & Time note created:    10/11/2023   4:30 PM     Referring MD / APRN:  JUSTEN Ramires, No att. providers found    Patient ID:  Name:             Angelina Porras   YOB: 1950  Age:                 73 y.o.  female   MRN:               [id number]    Chief Complaint/Reason for Visit:     Other (7th nerve palsy)      History of Present Illness:    Angelina Porras is a 73 y.o. female   Follow up 7th nerve palsy.Surgery for left ptosis at [place] scheduled for 11-9-2023.Patient complaining of decreased vision left eye x 6 months.        Review of Systems:  Review of Systems   Eyes:  Positive for blurred vision.        Droopy left lid   All other systems reviewed and are negative.      Past Medical History:   Past Medical History:   Diagnosis Date    Arthritis     Diplopia     Epidermoid cyst     Left-sided Bell's palsy        Past Surgical History:  Past Surgical History:   Procedure Laterality Date    HIP ARTHROPLASTY TOTAL Left 04/13/2023    CRANIOTOMY  11/12/2021    Epidermoid cyst    ARTHROPLASTY      total left knee    EYE SURGERY Left 12/17/2022    Tarorrhaphy    TONSILLECTOMY         Current Outpatient Medications:  Current Outpatient Medications   Medication Sig Dispense Refill    Sennosides (SENNA) 8.6 MG Tab Take 1 Tablet by mouth at bedtime. 14 Tablet 1     No current facility-administered medications for this visit.       Allergies:  Allergies   Allergen Reactions    Melatonin      Other reaction(s): body aches    Celebrex [Celecoxib] Unspecified     Pain     Codeine      Other reaction(s): Constipation       Family History:  Family History   Problem Relation Age of Onset    Heart Disease Mother         CHF    Arthritis Mother     Heart Disease Father     Diabetes Father        Social History:  Social History     Socioeconomic History    Marital status:      Spouse name: Not on file    Number of children:  Not on file    Years of education: Not on file    Highest education level: Not on file   Occupational History    Not on file   Tobacco Use    Smoking status: Never    Smokeless tobacco: Never   Vaping Use    Vaping Use: Never used   Substance and Sexual Activity    Alcohol use: No    Drug use: No    Sexual activity: Yes     Partners: Male   Other Topics Concern    Not on file   Social History Narrative    retired     Social Determinants of Health     Financial Resource Strain: Low Risk  (1/19/2023)    Overall Financial Resource Strain (CARDIA)     Difficulty of Paying Living Expenses: Not hard at all   Food Insecurity: No Food Insecurity (1/19/2023)    Hunger Vital Sign     Worried About Running Out of Food in the Last Year: Never true     Ran Out of Food in the Last Year: Never true   Transportation Needs: No Transportation Needs (1/19/2023)    PRAPARE - Transportation     Lack of Transportation (Medical): No     Lack of Transportation (Non-Medical): No   Physical Activity: Sufficiently Active (1/19/2023)    Exercise Vital Sign     Days of Exercise per Week: 7 days     Minutes of Exercise per Session: 90 min   Stress: No Stress Concern Present (1/19/2023)    [employer] - Occupational Stress Questionnaire     Feeling of Stress : Not at all   Social Connections: Socially Integrated (1/19/2023)    Social Connection and Isolation Panel [NHANES]     Frequency of Communication with Friends and Family: More than three times a week     Frequency of Social Gatherings with Friends and Family: Three times a week     Attends Latter-day Services: More than 4 times per year     Active Member of Clubs or Organizations: Yes     Attends Club or Organization Meetings: More than 4 times per year     Marital Status:    Intimate Partner Violence: Not At Risk (1/19/2023)    Humiliation, Afraid, Rape, and Kick questionnaire     Fear of Current or Ex-Partner: No     Emotionally Abused: No     Physically  Abused: No     Sexually Abused: No   Housing Stability: Low Risk  (1/19/2023)    Housing Stability Vital Sign     Unable to Pay for Housing in the Last Year: No     Number of Places Lived in the Last Year: 1     Unstable Housing in the Last Year: No          Physical Exam:  Physical Exam    Oriented x 3  Weight/BMI: There is no height or weight on file to calculate BMI.  There were no vitals taken for this visit.    Base Eye Exam       Visual Acuity (Snellen - Linear)         Right Left    Dist cc 20/20-2 20/30    Near cc J1 J1      Correction: Glasses              Tonometry (10:53 AM)         Right Left    Pressure 14 16              Pupils         Pupils    Right PERRL    Left PERRL              Extraocular Movement         Right Left     Full, Ortho Full, Ortho              Neuro/Psych       Oriented x3: Yes    Mood/Affect: Normal                  Additional Tests       Color         Right Left    Ishihara 9/9 9/9              Stereo       Fly: +    Animals: 3/3    Circles: 9/9                  Slit Lamp and Fundus Exam       External Exam         Right Left    External Normal Left peripheral 7th              Slit Lamp Exam         Right Left    Lids/Lashes Normal slight lid lag    Conjunctiva/Sclera White and quiet no inferior stain    Cornea Clear Clear    Anterior Chamber Deep and quiet Deep and quiet    Iris Round and reactive Round and reactive    Lens Nuclear sclerosis Nuclear sclerosis    Vitreous Normal Normal              Fundus Exam         Right Left    Disc Normal Normal    C/D Ratio 0.2 0.2    Macula Normal Normal    Vessels Normal Normal    Periphery Normal Normal                  Refraction       Wearing Rx         Sphere Cylinder Axis    Right -0.50 +2.00 177    Left -0.75 +1.75 167      Type: SVL              Manifest Refraction (Auto)         Sphere Cylinder Axis    Right +0.50 +2.00 175    Left -0.75 +1.75 169                    Pertinent Lab/Test/Imaging Review:      Assessment and Plan:      Left abducens nerve palsy  12/8/2021 - Left 6h nerve palsy following removal of CP angle epidermoid cyst 12/12/2021 - Eye barely getting to the midline. Dicussed continuing to monitor and at this point since eye so deviated could not be a candidate for temporary prisms. Hopefully will show improvement over the next few months. Dicussed alterate patching. Currently wearing an occlusion patch over the left lens   1/19/2022 - No significant improvement in abduction deficit. Still cannot abduct to mid line. Dicussed would wait at least 8 months prior to consideration of strabismus repair. With tarsorrhaphy, discussed might need to take down to allow access to the muscles.   4/4/2022 - Significant spontaneous improvement, ortho in right gaze and midline. Cannot tell whether full abduction since covered by Tarsorrhaphy, but essentially resolved. Has some bells  8/16/2022 - ortho today  3/1/2023 - ortho  10/11/2023-orthotropic    7th nerve palsy  12/8/2021 - Left 7th nerve palsy following CP angle epidermoid cyst removal 11/12/2021 at [facility]. Currently has significant lower lid retraction. Some bells, but inferior temporal chemosis an epithelial irregularity. Discussed changing to Lacrilube tid. Will refer to Dr Edwin Quesada in oculo plastics since would probably benefit from tarsorrhaphy.   1/19/2022 - Temporary tarsorrhaphy by Dr Quesada. Doing well. No significant corneal exposure today. Continue ocular lubricants  4/4/2022 - No signficant corneal exposure. Saw Dr Quesada recently who wanted to leave the tarsorrhaphy in place. Has follow up May 11th  8/16/2022 - Tarsorrhaphy removed. With forced eyelid closure, covers cornea. Some bells. No corneal stain. Is using AT q2 hours, dicussed that could begin to decrease frequency.   3/1/2023-using artificial tears frequently.  Improve lid closure.  No corneal stain.  Discussed could start decreasing frequency slightly of artificial tears  10/11/2023-going to [place] for their Bell's  palsy clinic.  Considering reinnervation surgery    Epidermoid cyst of brain  12/8/2021 - obtained Oct NFl thickness that was normal at 96 Od and 95 OS. No evidence of optic nerve swelling from increased intracranial pressure  4/4/2022 - Getting MRI done in may. Has some scalp sensitivity, especially when lying on the left side. discussed that could consider trial of Gabapentin or referral to pain service, but she wished to wait until after follow up MRI and discuss with neurosurgeon.   8/16/2022 - MRI 5/12/2022 -  Interval postsurgical changes of left occipital craniotomy for debulking of left pontine epidermoid cyst which is decreased in size. 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 Surgery done by Dr Daly at [facility]. Wish's to establish neurosurgeon here in [place]. Will refer to Dr All Garcia  3/1/2023 - 1/19/2023 - MRI Postsurgical changes of prior left occipital craniotomy for left pontine epidermoid cyst debulking. 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   2.  Unchanged small extra-axial fluid collection along the left cerebellum.  - had discussion with neurosurgeon at [facility].  Had virtual conference with neurosurgery.  However he is having some episodes recently of dizziness when awakening in the morning lasting a few hours.  She had the symptoms right after her initial surgical procedure but then they resolved.  Recommended she recontact neurosurgery.  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  Discussed possibly not lying as flat overnight.  There is no evidence of papilledema which would suggest increased intracranial pressure at this time.  OCT nerve fiber layer thickness 99 OD 98 OS      Sensorineural hearing loss (SNHL) of left ear  3/1/2023 - still cannot hear out of th left ear  10/11/2023-overall stable    Age-related nuclear cataract of  both eyes  10/11/2023-early bilateral nuclear sclerotic cataract both eyes    Glaucoma suspect of both eyes  10/11/2023-Sharp discs OU.  OCT neurofibrillary thickness 103 OD 98 OS        Dion Johnson M.D.

## 2023-10-11 NOTE — ASSESSMENT & PLAN NOTE
12/8/2021 - obtained Oct NFl thickness that was normal at 96 Od and 95 OS. No evidence of optic nerve swelling from increased intracranial pressure  4/4/2022 - Getting MRI done in may. Has some scalp sensitivity, especially when lying on the left side. discussed that could consider trial of Gabapentin or referral to pain service, but she wished to wait until after follow up MRI and discuss with neurosurgeon.   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 Surgery done by Dr Daly at [facility]. Wish's to establish neurosurgeon here in [place]. Will refer to Dr All Garcia  3/1/2023 - 1/19/2023 - MRI Postsurgical changes of prior left occipital craniotomy for left pontine epidermoid cyst debulking. 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   2.  Unchanged small extra-axial fluid collection along the left cerebellum.  - had discussion with neurosurgeon at [facility].  Had virtual conference with neurosurgery.  However he is having some episodes recently of dizziness when awakening in the morning lasting a few hours.  She had the symptoms right after her initial surgical procedure but then they resolved.  Recommended she recontact neurosurgery.  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  Discussed possibly not lying as flat overnight.  There is no evidence of papilledema which would suggest increased intracranial pressure at this time.  OCT nerve fiber layer thickness 99 OD 98 OS

## 2024-02-27 PROBLEM — E78.01 FAMILIAL HYPERCHOLESTEROLEMIA: Status: RESOLVED | Noted: 2021-04-14 | Resolved: 2024-02-27

## 2024-02-27 PROBLEM — H91.90 LOSS OF HEARING: Status: RESOLVED | Noted: 2023-05-04 | Resolved: 2024-02-27

## 2024-02-27 PROBLEM — Z76.89 ENCOUNTER TO ESTABLISH CARE WITH NEW DOCTOR: Status: RESOLVED | Noted: 2023-01-19 | Resolved: 2024-02-27

## 2024-02-27 PROBLEM — M16.12 PRIMARY OSTEOARTHRITIS OF LEFT HIP: Status: RESOLVED | Noted: 2023-01-19 | Resolved: 2024-02-27

## 2024-02-27 PROBLEM — M19.041 PRIMARY OSTEOARTHRITIS OF BOTH HANDS: Status: RESOLVED | Noted: 2017-03-22 | Resolved: 2024-02-27

## 2024-02-27 PROBLEM — Z79.899 HIGH RISK MEDICATION USE: Status: RESOLVED | Noted: 2023-08-24 | Resolved: 2024-02-27

## 2024-02-27 PROBLEM — M19.042 PRIMARY OSTEOARTHRITIS OF BOTH HANDS: Status: RESOLVED | Noted: 2017-03-22 | Resolved: 2024-02-27

## 2024-02-27 PROBLEM — Z00.00 MEDICARE ANNUAL WELLNESS VISIT, SUBSEQUENT: Status: RESOLVED | Noted: 2023-08-23 | Resolved: 2024-02-27

## 2024-02-27 PROBLEM — M25.569 KNEE PAIN: Status: RESOLVED | Noted: 2023-05-04 | Resolved: 2024-02-27

## 2024-02-27 PROBLEM — H40.003 GLAUCOMA SUSPECT OF BOTH EYES: Status: RESOLVED | Noted: 2023-10-11 | Resolved: 2024-02-27

## 2024-02-27 PROBLEM — D64.9 ANEMIA OF UNKNOWN ETIOLOGY: Status: RESOLVED | Noted: 2023-08-24 | Resolved: 2024-02-27

## 2024-02-27 PROBLEM — R73.03 PREDIABETES: Status: ACTIVE | Noted: 2024-02-27

## 2024-02-27 PROBLEM — Z96.642 STATUS POST TOTAL HIP REPLACEMENT, LEFT: Status: RESOLVED | Noted: 2023-04-14 | Resolved: 2024-02-27

## 2024-02-27 PROBLEM — R73.9 HYPERGLYCEMIA: Status: RESOLVED | Noted: 2023-08-24 | Resolved: 2024-02-27

## 2024-02-27 PROBLEM — Z13.29 SCREENING FOR THYROID DISORDER: Status: RESOLVED | Noted: 2023-08-24 | Resolved: 2024-02-27

## 2024-02-27 PROBLEM — Z09 HOSPITAL DISCHARGE FOLLOW-UP: Status: RESOLVED | Noted: 2023-05-04 | Resolved: 2024-02-27

## 2024-02-27 PROBLEM — M19.90 OSTEOARTHROSIS: Status: RESOLVED | Noted: 2023-05-04 | Resolved: 2024-02-27

## 2024-03-26 PROBLEM — I35.1 MILD AORTIC INSUFFICIENCY: Status: ACTIVE | Noted: 2024-03-26

## 2024-08-27 PROBLEM — K86.9 PANCREATIC LESION: Status: ACTIVE | Noted: 2024-08-27

## 2024-08-27 PROBLEM — J43.9 MILD EMPHYSEMA (HCC): Status: ACTIVE | Noted: 2024-08-27

## 2024-09-16 NOTE — ASSESSMENT & PLAN NOTE
10/11/2023-Sharp discs OU.  OCT neurofibrillary thickness 103 OD 98 OS   Patient requests all Lab, Cardiology, and Radiology Results on their Discharge Instructions

## 2024-10-16 ENCOUNTER — OFFICE VISIT (OUTPATIENT)
Dept: OPHTHALMOLOGY | Facility: MEDICAL CENTER | Age: 74
End: 2024-10-16
Payer: MEDICARE

## 2024-10-16 DIAGNOSIS — H40.003 GLAUCOMA SUSPECT OF BOTH EYES: ICD-10-CM

## 2024-10-16 DIAGNOSIS — R73.03 PREDIABETES: ICD-10-CM

## 2024-10-16 DIAGNOSIS — H52.4 PRESBYOPIA OF BOTH EYES: ICD-10-CM

## 2024-10-16 DIAGNOSIS — G51.0 7TH NERVE PALSY: ICD-10-CM

## 2024-10-16 DIAGNOSIS — H49.22 LEFT ABDUCENS NERVE PALSY: ICD-10-CM

## 2024-10-16 DIAGNOSIS — H25.13 AGE-RELATED NUCLEAR CATARACT OF BOTH EYES: ICD-10-CM

## 2024-10-16 DIAGNOSIS — G93.0 EPIDERMOID CYST OF BRAIN: ICD-10-CM

## 2024-10-16 PROCEDURE — 92015 DETERMINE REFRACTIVE STATE: CPT | Performed by: OPHTHALMOLOGY

## 2024-10-16 PROCEDURE — 92250 FUNDUS PHOTOGRAPHY W/I&R: CPT | Performed by: OPHTHALMOLOGY

## 2024-10-16 PROCEDURE — 99214 OFFICE O/P EST MOD 30 MIN: CPT | Mod: 25 | Performed by: OPHTHALMOLOGY

## 2024-10-16 ASSESSMENT — SLIT LAMP EXAM - LIDS: COMMENTS: NORMAL

## 2024-10-16 ASSESSMENT — CUP TO DISC RATIO
OD_RATIO: 0.2
OS_RATIO: 0.2

## 2024-10-16 ASSESSMENT — REFRACTION_WEARINGRX
SPECS_TYPE: SVL
OD_SPHERE: +2.25
OD_SPHERE: -0.25
OS_CYLINDER: +1.75
OS_AXIS: 174
OD_CYLINDER: +1.75
OS_AXIS: 180
OD_CYLINDER: +2.00
OD_AXIS: 006
OS_SPHERE: -0.75
OD_AXIS: 180
OS_SPHERE: +1.75
OS_CYLINDER: +1.75

## 2024-10-16 ASSESSMENT — TONOMETRY
OD_IOP_MMHG: 12
OS_IOP_MMHG: 10

## 2024-10-16 ASSESSMENT — REFRACTION_MANIFEST
OS_SPHERE: -0.75
OD_CYLINDER: +1.75
METHOD_AUTOREFRACTION: 1
OS_CYLINDER: +2.00
OD_AXIS: 173
OD_SPHERE: +0.50
OS_AXIS: 168

## 2024-10-16 ASSESSMENT — EXTERNAL EXAM - RIGHT EYE: OD_EXAM: NORMAL

## 2024-10-16 ASSESSMENT — VISUAL ACUITY
OD_CC: 20/25
CORRECTION_TYPE: GLASSES
METHOD: SNELLEN - LINEAR
OS_CC: 20/25+2